# Patient Record
Sex: FEMALE | Race: WHITE | NOT HISPANIC OR LATINO | Employment: PART TIME | URBAN - METROPOLITAN AREA
[De-identification: names, ages, dates, MRNs, and addresses within clinical notes are randomized per-mention and may not be internally consistent; named-entity substitution may affect disease eponyms.]

---

## 2018-07-11 ENCOUNTER — HOSPITAL ENCOUNTER (OUTPATIENT)
Dept: MAMMOGRAPHY | Facility: CLINIC | Age: 63
Discharge: HOME | End: 2018-07-11
Attending: OBSTETRICS & GYNECOLOGY | Admitting: OBSTETRICS & GYNECOLOGY
Payer: COMMERCIAL

## 2018-07-11 DIAGNOSIS — Z12.31 VISIT FOR SCREENING MAMMOGRAM: ICD-10-CM

## 2018-07-11 PROCEDURE — 77063 BREAST TOMOSYNTHESIS BI: CPT

## 2020-11-03 ENCOUNTER — OFFICE VISIT (OUTPATIENT)
Dept: VASCULAR SURGERY | Facility: CLINIC | Age: 65
End: 2020-11-03
Payer: COMMERCIAL

## 2020-11-03 DIAGNOSIS — I83.813 VARICOSE VEINS OF BILATERAL LOWER EXTREMITIES WITH PAIN: Primary | ICD-10-CM

## 2020-11-03 PROCEDURE — 99203 OFFICE O/P NEW LOW 30 MIN: CPT | Performed by: SURGERY

## 2020-11-03 NOTE — LETTER
11/3/2020         RE: Kylie Kilgore  [address] [facility] [address]        Dear Colleague,    Thank you for referring your patient, Kylie Kilgore, to the [facility] [place]. Please see a copy of my visit note below.    VEIN SOLUTIONS CONSULT    Impression:   1.  Right leg varicose veins with pain.    2.  Scattered bilateral lower extremity spider telangiectasias.    Plan:   I reviewed all the above with Kylie.  She is given a prescription for knee-high and thigh-high compression stockings of 20 to 30 mmHg pressure.  She was instructed in their daily use.  I will arrange for a right leg venous competency study through this office and I will meet with her in the day of that exam to discuss her diagnosis and treatment options.    HPI:   Kylie Kilgore is a healthy 65-year-old female who presents at this time for evaluation of some right leg varicosities.  She openly admits that the real reason for her visit is due to a strong familial history of stroke.  She wonders whether there is any relationship between her varicose veins and her risk of stroke.    Her past medical history is relatively unremarkable.  She has no prior history of venous intervention.      CURRENT MEDICATIONS  No current outpatient medications on file prior to visit.  No current facility-administered medications on file prior to visit.         PAST MEDICAL HISTORY  Past Medical History:   Diagnosis Date     Disorder of bone and cartilage, unspecified 9/23/2004    moderate osteopenia     Other abnormal glucose          PAST SURGICAL HISTORY:  Past Surgical History:   Procedure Laterality Date     C LIGATE FALLOPIAN TUBE,POSTPARTUM  1993    Tubal Ligation     C MAMMOGRAM, SCREENING  6/2000    yearly watching one area, fibrocystic chnages     COLPOSCOPY,BX CERVIX/ENDOCERV CURR  1979     HCL PAP SMEAR  7/2000    Dr Rendon       ALLERGIES     Allergies   Allergen Reactions     Erythromycin        FAMILY HISTORY  Family History    Problem Relation Age of Onset     Diabetes Mother         type 2     Hypertension Mother      Osteoporosis Mother      Cerebrovascular Disease Mother         age 82     Hypertension Father      Diabetes Sister         type 2       SOCIAL HISTORY  Social History     Tobacco Use     Smoking status: Never Smoker     Smokeless tobacco: Never Used   Substance Use Topics     Alcohol use: Yes     Drug use: No       ROS:   Review of Systems   All other systems reviewed and are negative.        EXAM:  There were no vitals taken for this visit.  Physical Exam  Constitutional:       Appearance: Normal appearance.   HENT:      Head: Normocephalic and atraumatic.   Eyes:      General: No scleral icterus.     Extraocular Movements: Extraocular movements intact.      Pupils: Pupils are equal, round, and reactive to light.   Neck:      Musculoskeletal: Normal range of motion.   Cardiovascular:      Rate and Rhythm: Normal rate and regular rhythm.      Pulses: Normal pulses.      Comments: Mild right thigh varicosities.  No edema.  No lipodermatosclerosis change.  Musculoskeletal: Normal range of motion.         General: No swelling.   Skin:     General: Skin is warm and dry.   Neurological:      General: No focal deficit present.      Mental Status: She is alert and oriented to person, place, and time. Mental status is at baseline.   Psychiatric:         Mood and Affect: Mood normal.         Behavior: Behavior normal.         Thought Content: Thought content normal.       Labs:  LIPID RESULTS:  Lab Results   Component Value Date    CHOL 185 08/19/2004    HDL 99 08/19/2004    LDL 75 08/19/2004    TRIG 53 08/19/2004    CHOLHDLRATIO 1.9 08/19/2004       CBC RESULTS:  Lab Results   Component Value Date    HGB 13.2 08/18/2004       BMP RESULTS:  Lab Results   Component Value Date    GLC 95.8 08/18/2004        A1C RESULTS:  No results found for: A1C      Imaging:  No results found for this or any previous visit (from the past 24  hour(s)).        Total face-to-face time was 20 minutes, greater than 50% spent providing counseling and education.    Chong Mathis MD          Again, thank you for allowing me to participate in the care of your patient.        Sincerely,        Chong Mathis MD

## 2020-11-03 NOTE — PROGRESS NOTES
VEIN SOLUTIONS CONSULT    Impression:   1.  Right leg varicose veins with pain.    2.  Scattered bilateral lower extremity spider telangiectasias.    Plan:   I reviewed all the above with Kylie.  She is given a prescription for knee-high and thigh-high compression stockings of 20 to 30 mmHg pressure.  She was instructed in their daily use.  I will arrange for a right leg venous competency study through this office and I will meet with her in the day of that exam to discuss her diagnosis and treatment options.    HPI:   Kylie Kilgore is a healthy 65-year-old female who presents at this time for evaluation of some right leg varicosities.  She openly admits that the real reason for her visit is due to a strong familial history of stroke.  She wonders whether there is any relationship between her varicose veins and her risk of stroke.    Her past medical history is relatively unremarkable.  She has no prior history of venous intervention.      CURRENT MEDICATIONS  No current outpatient medications on file prior to visit.  No current facility-administered medications on file prior to visit.         PAST MEDICAL HISTORY  Past Medical History:   Diagnosis Date     Disorder of bone and cartilage, unspecified 9/23/2004    moderate osteopenia     Other abnormal glucose          PAST SURGICAL HISTORY:  Past Surgical History:   Procedure Laterality Date     C LIGATE FALLOPIAN TUBE,POSTPARTUM  1993    Tubal Ligation     C MAMMOGRAM, SCREENING  6/2000    yearly watching one area, fibrocystic chnages     COLPOSCOPY,BX CERVIX/ENDOCERV CURR  1979     HCL PAP SMEAR  7/2000    Dr Rendon       ALLERGIES     Allergies   Allergen Reactions     Erythromycin        FAMILY HISTORY  Family History   Problem Relation Age of Onset     Diabetes Mother         type 2     Hypertension Mother      Osteoporosis Mother      Cerebrovascular Disease Mother         age 82     Hypertension Father      Diabetes Sister         type 2       SOCIAL  HISTORY  Social History     Tobacco Use     Smoking status: Never Smoker     Smokeless tobacco: Never Used   Substance Use Topics     Alcohol use: Yes     Drug use: No       ROS:   Review of Systems   All other systems reviewed and are negative.        EXAM:  There were no vitals taken for this visit.  Physical Exam  Constitutional:       Appearance: Normal appearance.   HENT:      Head: Normocephalic and atraumatic.   Eyes:      General: No scleral icterus.     Extraocular Movements: Extraocular movements intact.      Pupils: Pupils are equal, round, and reactive to light.   Neck:      Musculoskeletal: Normal range of motion.   Cardiovascular:      Rate and Rhythm: Normal rate and regular rhythm.      Pulses: Normal pulses.      Comments: Mild right thigh varicosities.  No edema.  No lipodermatosclerosis change.  Musculoskeletal: Normal range of motion.         General: No swelling.   Skin:     General: Skin is warm and dry.   Neurological:      General: No focal deficit present.      Mental Status: She is alert and oriented to person, place, and time. Mental status is at baseline.   Psychiatric:         Mood and Affect: Mood normal.         Behavior: Behavior normal.         Thought Content: Thought content normal.       Labs:  LIPID RESULTS:  Lab Results   Component Value Date    CHOL 185 08/19/2004    HDL 99 08/19/2004    LDL 75 08/19/2004    TRIG 53 08/19/2004    CHOLHDLRATIO 1.9 08/19/2004       CBC RESULTS:  Lab Results   Component Value Date    HGB 13.2 08/18/2004       BMP RESULTS:  Lab Results   Component Value Date    GLC 95.8 08/18/2004        A1C RESULTS:  No results found for: A1C      Imaging:  No results found for this or any previous visit (from the past 24 hour(s)).        Total face-to-face time was 20 minutes, greater than 50% spent providing counseling and education.    Chong Mathis MD

## 2020-12-01 ENCOUNTER — OFFICE VISIT (OUTPATIENT)
Dept: VASCULAR SURGERY | Facility: CLINIC | Age: 65
End: 2020-12-01
Attending: SURGERY
Payer: COMMERCIAL

## 2020-12-01 ENCOUNTER — ANCILLARY PROCEDURE (OUTPATIENT)
Dept: ULTRASOUND IMAGING | Facility: CLINIC | Age: 65
End: 2020-12-01
Attending: SURGERY
Payer: COMMERCIAL

## 2020-12-01 DIAGNOSIS — I83.813 VARICOSE VEINS OF BILATERAL LOWER EXTREMITIES WITH PAIN: ICD-10-CM

## 2020-12-01 DIAGNOSIS — I83.91 ASYMPTOMATIC VARICOSE VEINS OF RIGHT LOWER EXTREMITY: Primary | ICD-10-CM

## 2020-12-01 PROCEDURE — 99213 OFFICE O/P EST LOW 20 MIN: CPT | Performed by: SURGERY

## 2020-12-01 PROCEDURE — 93971 EXTREMITY STUDY: CPT | Mod: RT | Performed by: SURGERY

## 2020-12-01 NOTE — PROGRESS NOTES
Kylie Kilgore is a 65-year-old female previously evaluated by me in this office for right leg varicosities.  Please see my dictation from that 11/3/2020 visit.  She returns today for a right leg venous competency study.  She has been wearing thigh-high compression stockings with some symptomatic relief of her mild right leg discomfort.  There has been no other change in her health history or her physical exam.    Imaging:  Right leg venous ultrasound performed earlier today shows no deep venous pathology.  She has some incompetence of her right greater saphenous vein from the saphenofemoral junction to the distal thigh.  Her right greater saphenous vein below that point is competent.  The incompetent stretch of the right thigh GSV ranges between 5.0 mm at the junction to 3.8 millimeters distally.    ASSESSMENT:  1.  Incompetence of the right thigh greater saphenous vein from the saphenofemoral junction to the distal thigh.  Minimal associated right leg discomfort easily relieved by compression stockings.    RECOMMENDATION:  I had a nice discussion with Kylie reviewing her ultrasound results.  She understands that she has incompetence of a somewhat small right thigh greater saphenous vein which could potentially be ablated.  Her primary reason for presenting to this clinic was her strong concerns about a familial history of stroke.  She now realizes that limited lower extremity venous incompetence has nothing to do with her risks for stroke.  Her right leg venous symptoms are mild and easily controlled with compression stockings.  Presently she is not interested in right leg venous intervention and I am comfortable with that decision.  She will wear her stockings daily.  Venous follow-up can be with me on an as-needed basis.    Total face-to-face time was 15 minutes, greater than 50% spent providing counseling and education.    Jose Mathis MD

## 2020-12-01 NOTE — LETTER
12/1/2020         RE: Kylie Kilgore  [address] [facility] [address]        Dear Colleague,    Thank you for referring your patient, Kylie Kilgore, to the [facility]IN [facility] [place]. Please see a copy of my visit note below.    Kylie Kilgore is a 65-year-old female previously evaluated by me in this office for right leg varicosities.  Please see my dictation from that 11/3/2020 visit.  She returns today for a right leg venous competency study.  She has been wearing thigh-high compression stockings with some symptomatic relief of her mild right leg discomfort.  There has been no other change in her health history or her physical exam.    Imaging:  Right leg venous ultrasound performed earlier today shows no deep venous pathology.  She has some incompetence of her right greater saphenous vein from the saphenofemoral junction to the distal thigh.  Her right greater saphenous vein below that point is competent.  The incompetent stretch of the right thigh GSV ranges between 5.0 mm at the junction to 3.8 millimeters distally.    ASSESSMENT:  1.  Incompetence of the right thigh greater saphenous vein from the saphenofemoral junction to the distal thigh.  Minimal associated right leg discomfort easily relieved by compression stockings.    RECOMMENDATION:  I had a nice discussion with Kylie reviewing her ultrasound results.  She understands that she has incompetence of a somewhat small right thigh greater saphenous vein which could potentially be ablated.  Her primary reason for presenting to this clinic was her strong concerns about a familial history of stroke.  She now realizes that limited lower extremity venous incompetence has nothing to do with her risks for stroke.  Her right leg venous symptoms are mild and easily controlled with compression stockings.  Presently she is not interested in right leg venous intervention and I am comfortable with that decision.  She will wear her stockings daily.   Venous follow-up can be with me on an as-needed basis.    Total face-to-face time was 15 minutes, greater than 50% spent providing counseling and education.    Jose Mathis MD      Again, thank you for allowing me to participate in the care of your patient.        Sincerely,        Chong Mathis MD

## 2021-03-13 ENCOUNTER — HEALTH MAINTENANCE LETTER (OUTPATIENT)
Age: 66
End: 2021-03-13

## 2021-05-26 ENCOUNTER — TRANSFERRED RECORDS (OUTPATIENT)
Dept: HEALTH INFORMATION MANAGEMENT | Facility: CLINIC | Age: 66
End: 2021-05-26

## 2021-06-22 ENCOUNTER — MEDICAL CORRESPONDENCE (OUTPATIENT)
Dept: HEALTH INFORMATION MANAGEMENT | Facility: CLINIC | Age: 66
End: 2021-06-22

## 2021-06-22 ENCOUNTER — TRANSFERRED RECORDS (OUTPATIENT)
Dept: HEALTH INFORMATION MANAGEMENT | Facility: CLINIC | Age: 66
End: 2021-06-22

## 2021-06-22 DIAGNOSIS — R94.31 ELECTROCARDIOGRAM SHOWING T WAVE ABNORMALITIES: Primary | ICD-10-CM

## 2021-07-08 PROBLEM — N81.10 VAGINAL WALL PROLAPSE: Status: ACTIVE | Noted: 2021-07-08

## 2021-07-08 PROBLEM — R94.31 ABNORMAL ELECTROCARDIOGRAM: Status: ACTIVE | Noted: 2021-07-08

## 2021-07-08 PROBLEM — N81.4 UTERINE PROLAPSE: Status: ACTIVE | Noted: 2021-07-08

## 2021-07-12 ENCOUNTER — HOSPITAL ENCOUNTER (OUTPATIENT)
Dept: MAMMOGRAPHY | Facility: CLINIC | Age: 66
End: 2021-07-12
Attending: OBSTETRICS & GYNECOLOGY
Payer: COMMERCIAL

## 2021-07-12 ENCOUNTER — HOSPITAL ENCOUNTER (OUTPATIENT)
Dept: ULTRASOUND IMAGING | Facility: CLINIC | Age: 66
End: 2021-07-12
Attending: OBSTETRICS & GYNECOLOGY
Payer: COMMERCIAL

## 2021-07-12 ENCOUNTER — ANCILLARY PROCEDURE (OUTPATIENT)
Dept: BONE DENSITY | Facility: CLINIC | Age: 66
End: 2021-07-12
Attending: OBSTETRICS & GYNECOLOGY
Payer: COMMERCIAL

## 2021-07-12 DIAGNOSIS — Z13.820 ENCOUNTER FOR SCREENING FOR OSTEOPOROSIS: ICD-10-CM

## 2021-07-12 DIAGNOSIS — N63.11 LUMP IN UPPER OUTER QUADRANT OF RIGHT BREAST: ICD-10-CM

## 2021-07-12 PROCEDURE — 77080 DXA BONE DENSITY AXIAL: CPT | Performed by: INTERNAL MEDICINE

## 2021-07-12 PROCEDURE — 77062 BREAST TOMOSYNTHESIS BI: CPT

## 2021-07-12 PROCEDURE — 76642 ULTRASOUND BREAST LIMITED: CPT | Mod: RT

## 2021-07-19 ENCOUNTER — OFFICE VISIT (OUTPATIENT)
Dept: CARDIOLOGY | Facility: CLINIC | Age: 66
End: 2021-07-19
Payer: COMMERCIAL

## 2021-07-19 VITALS
SYSTOLIC BLOOD PRESSURE: 132 MMHG | DIASTOLIC BLOOD PRESSURE: 82 MMHG | WEIGHT: 132 LBS | OXYGEN SATURATION: 99 % | HEIGHT: 63 IN | BODY MASS INDEX: 23.39 KG/M2 | HEART RATE: 69 BPM

## 2021-07-19 DIAGNOSIS — R94.31 NONSPECIFIC ABNORMAL ELECTROCARDIOGRAM (ECG) (EKG): ICD-10-CM

## 2021-07-19 DIAGNOSIS — Z13.6 SCREENING FOR CARDIOVASCULAR CONDITION: Primary | ICD-10-CM

## 2021-07-19 DIAGNOSIS — I20.89 ATYPICAL ANGINA (H): ICD-10-CM

## 2021-07-19 DIAGNOSIS — Z82.3 FAMILY HISTORY OF CEREBROVASCULAR ACCIDENT (CVA): ICD-10-CM

## 2021-07-19 PROCEDURE — 99203 OFFICE O/P NEW LOW 30 MIN: CPT | Performed by: INTERNAL MEDICINE

## 2021-07-19 PROCEDURE — 93000 ELECTROCARDIOGRAM COMPLETE: CPT | Performed by: INTERNAL MEDICINE

## 2021-07-19 ASSESSMENT — MIFFLIN-ST. JEOR: SCORE: 1102.88

## 2021-07-19 NOTE — LETTER
2021    MD Zuri Whitaker Consults [address]  [facility] [address]    RE: Kylie Kilgore       Dear Colleague,    I had the pleasure of seeing Kylie Kilgore in the [facility].    CARDIOLOGY CLINIC CONSULTATION      REASON FOR CONSULT:   Abnormal ECG    PRIMARY CARE PHYSICIAN:  Cristi Rendon        History of Present Illness   Kylie Kilgore is an extremely pleasant 66 year old female with no personal past medical history, but with a family history of CVA and atrial fibrillation, here to discuss an abnormal ECG that was done by her life insurance company.  She tells me that from a family history standpoint, her mother  of a stroke in her early 80s, 1 brother  from complications related to atrial fibrillation in his early 70s, and another brother had a stroke in his early 70s but survived.  No family history of CAD, stents, bypass surgery, etc.  She smoked briefly in high school, but not since then.  She does drink around 4 or 5 drinks per weekend.    On 2021, she had an ECG done as part of a life insurance evaluation.  The tracing quality is not great, but she was told that she had T wave inversions, and as result her PCP, Dr. Rendon, referred her here for evaluation.  She is very active and walks daily, with essentially no symptoms.  She has no chest pain or shortness of breath with exertion, though at the end of her walks when she leans down to take off her dog's collar, when she stands up she does get dizzy for around 30 seconds.  This does not happen at other times of the day.  Otherwise, no complaints.    I repeated an ECG in the office today, this showed normal sinus rhythm with nonspecific T wave changes and slightly delayed R wave progression.  There is no echo or other ischemic evaluation or system.  She showed me a printout of all of her recent labs, and this showed an LDL of 68 and an HDL of  84.      Assessment & Plan     1. Abnormal ECG, intermittent lightheadedness following exertion  2. Family history of CVA and atrial fibrillation      It was a pleasure to meet with Jaclyn today.  We discussed the meaning of her ECG abnormalities.  Overall, I reassured her that these are likely normal and in the setting of her relatively minimal symptoms, probably do not represent a concerning finding.  That said, with her family history of CVA and some lightheadedness following exertion, I think it is reasonable to check an exercise stress echo to rule out any evidence of inducible ischemia and/or any valvular abnormalities.  If this is reassuring, I do not think that she needs any regular cardiac follow-up.  We also discussed her cholesterol test, and I congratulated her on her great results.  However, I did caution her that I am a bit concerned that her HDL of 84 may be a signal of excessive alcohol intake, and did caution her about watching her drinking.  Otherwise, I think that she is doing great and encouraged her to continue doing what she is doing.      -Exercise stress echo  -No indication for statin at this time  -Continue with excellent diet and exercise regimen      Follow-up: No routine cardiac follow-up needed at this time unless MARK is significantly abnormal.  However, we are always happy to see Jaclyn back at anytime in the future if any questions or concerns arise.        William Romano MD  Interventional Cardiology  July 19, 2021        Medications   No current outpatient medications on file.     No current facility-administered medications for this visit.     Allergies   Allergies   Allergen Reactions     Erythromycin          Physical Exam       BP: 132/82 Pulse: 69     SpO2: 99 %      Vital Signs with Ranges  Pulse:  [69] 69  BP: (132)/(82) 132/82  SpO2:  [99 %] 99 %  132 lbs 0 oz    Constitutional: Well-appearing, no acute distress  Respiratory: Normal respiratory effort, CTAB  Cardiovascular:  RRR, no m/r/g.  JVP < 7 cm H2O.  There is no LE edema.  Normal carotid upstrokes, no carotid bruits.                      Thank you for allowing me to participate in the care of your patient.      Sincerely,     William Romano MD     [facility]  cc:   No referring provider defined for this encounter.

## 2021-07-19 NOTE — PROGRESS NOTES
CARDIOLOGY CLINIC CONSULTATION      REASON FOR CONSULT:   Abnormal ECG    PRIMARY CARE PHYSICIAN:  Cristi Rendon        History of Present Illness   Kylie Kilgore is an extremely pleasant 66 year old female with no personal past medical history, but with a family history of CVA and atrial fibrillation, here to discuss an abnormal ECG that was done by her life insurance company.  She tells me that from a family history standpoint, her mother  of a stroke in her early 80s, 1 brother  from complications related to atrial fibrillation in his early 70s, and another brother had a stroke in his early 70s but survived.  No family history of CAD, stents, bypass surgery, etc.  She smoked briefly in high school, but not since then.  She does drink around 4 or 5 drinks per weekend.    On 2021, she had an ECG done as part of a life insurance evaluation.  The tracing quality is not great, but she was told that she had T wave inversions, and as result her PCP, Dr. Rendon, referred her here for evaluation.  She is very active and walks daily, with essentially no symptoms.  She has no chest pain or shortness of breath with exertion, though at the end of her walks when she leans down to take off her dog's collar, when she stands up she does get dizzy for around 30 seconds.  This does not happen at other times of the day.  Otherwise, no complaints.    I repeated an ECG in the office today, this showed normal sinus rhythm with nonspecific T wave changes and slightly delayed R wave progression.  There is no echo or other ischemic evaluation or system.  She showed me a printout of all of her recent labs, and this showed an LDL of 68 and an HDL of 84.      Assessment & Plan     1. Abnormal ECG, intermittent lightheadedness following exertion  2. Family history of CVA and atrial fibrillation      It was a pleasure to meet with Jaclyn today.  We discussed the meaning of her ECG abnormalities.  Overall, I reassured her that these  are likely normal and in the setting of her relatively minimal symptoms, probably do not represent a concerning finding.  That said, with her family history of CVA and some lightheadedness following exertion, I think it is reasonable to check an exercise stress echo to rule out any evidence of inducible ischemia and/or any valvular abnormalities.  If this is reassuring, I do not think that she needs any regular cardiac follow-up.  We also discussed her cholesterol test, and I congratulated her on her great results.  However, I did caution her that I am a bit concerned that her HDL of 84 may be a signal of excessive alcohol intake, and did caution her about watching her drinking.  Otherwise, I think that she is doing great and encouraged her to continue doing what she is doing.      -Exercise stress echo  -No indication for statin at this time  -Continue with excellent diet and exercise regimen      Follow-up: No routine cardiac follow-up needed at this time unless MARK is significantly abnormal.  However, we are always happy to see Jaclyn back at anytime in the future if any questions or concerns arise.        William Romano MD  Interventional Cardiology  July 19, 2021        Medications   No current outpatient medications on file.     No current facility-administered medications for this visit.     Allergies   Allergies   Allergen Reactions     Erythromycin          Physical Exam       BP: 132/82 Pulse: 69     SpO2: 99 %      Vital Signs with Ranges  Pulse:  [69] 69  BP: (132)/(82) 132/82  SpO2:  [99 %] 99 %  132 lbs 0 oz    Constitutional: Well-appearing, no acute distress  Respiratory: Normal respiratory effort, CTAB  Cardiovascular: RRR, no m/r/g.  JVP < 7 cm H2O.  There is no LE edema.  Normal carotid upstrokes, no carotid bruits.

## 2021-08-03 ENCOUNTER — MYC MEDICAL ADVICE (OUTPATIENT)
Dept: CARDIOLOGY | Facility: CLINIC | Age: 66
End: 2021-08-03

## 2021-08-03 ENCOUNTER — HOSPITAL ENCOUNTER (OUTPATIENT)
Dept: CARDIOLOGY | Facility: CLINIC | Age: 66
Discharge: HOME | End: 2021-08-03
Attending: INTERNAL MEDICINE | Admitting: INTERNAL MEDICINE
Payer: COMMERCIAL

## 2021-08-03 DIAGNOSIS — I20.89 ATYPICAL ANGINA (H): ICD-10-CM

## 2021-08-03 PROCEDURE — 93016 CV STRESS TEST SUPVJ ONLY: CPT | Performed by: INTERNAL MEDICINE

## 2021-08-03 PROCEDURE — 93017 CV STRESS TEST TRACING ONLY: CPT

## 2021-08-03 PROCEDURE — 93321 DOPPLER ECHO F-UP/LMTD STD: CPT | Mod: 26 | Performed by: INTERNAL MEDICINE

## 2021-08-03 PROCEDURE — 93325 DOPPLER ECHO COLOR FLOW MAPG: CPT | Mod: 26 | Performed by: INTERNAL MEDICINE

## 2021-08-03 PROCEDURE — 93018 CV STRESS TEST I&R ONLY: CPT | Performed by: INTERNAL MEDICINE

## 2021-08-03 PROCEDURE — 93350 STRESS TTE ONLY: CPT | Mod: 26 | Performed by: INTERNAL MEDICINE

## 2021-08-03 PROCEDURE — 93350 STRESS TTE ONLY: CPT | Mod: TC

## 2021-08-03 NOTE — TELEPHONE ENCOUNTER
William Romano MD Haley, Leandra K RN  Caller: Unspecified (Today,  4:09 PM)  Phone Number: [phone]   I'm OK doing something to help her with the life insurance, but I need to get some guidance from the life insurance company on what they are looking for.  I can describe her test results and that she has relatively low risk of significant CAD, but I have a feeling that won't mean much to the insurance company.     Thanks,   Ramón

## 2021-08-10 NOTE — TELEPHONE ENCOUNTER
William Romano MD Haley, Leandra K, RN  Caller: Unspecified (1 week ago,  4:09 PM)  Phone Number: [phone]   Noah Hennessy     Sorrachel for the delay.  Here's what I would put:       Ms. Kilgore was seen in clinic due to an abnormal screening ECG as part of a life insurance exam.  I repeated an ECG in the office, and this showed mild T-wave abnormalities and poor R-wave progression in the precordial leads.  Both of these are non-specific findings which do not necessarily indicate underlying coronary artery disease or other cardiac disease generally.  We then had her perform an exercise stress echocardiogram.  She did very well with this, achieving target heart rate, demonstrating above-average exercise capacity, and had no significant inducible ECG or echocardiographic abnormalities consistent with ischemia.  She also had no significant valve abnormalities.  Taken together, these results overall indicate a low risk of obstructive coronary artery disease.     Thanks

## 2021-10-23 ENCOUNTER — HEALTH MAINTENANCE LETTER (OUTPATIENT)
Age: 66
End: 2021-10-23

## 2022-04-09 ENCOUNTER — HEALTH MAINTENANCE LETTER (OUTPATIENT)
Age: 67
End: 2022-04-09

## 2022-07-30 ENCOUNTER — HEALTH MAINTENANCE LETTER (OUTPATIENT)
Age: 67
End: 2022-07-30

## 2022-10-09 ENCOUNTER — HEALTH MAINTENANCE LETTER (OUTPATIENT)
Age: 67
End: 2022-10-09

## 2023-07-10 ENCOUNTER — APPOINTMENT (OUTPATIENT)
Dept: URBAN - METROPOLITAN AREA CLINIC 259 | Age: 68
Setting detail: DERMATOLOGY
End: 2023-07-13

## 2023-07-10 PROBLEM — C44.319 BASAL CELL CARCINOMA OF SKIN OF OTHER PARTS OF FACE: Status: ACTIVE | Noted: 2023-07-10

## 2023-07-10 PROCEDURE — 14040 TIS TRNFR F/C/C/M/N/A/G/H/F: CPT

## 2023-07-10 PROCEDURE — OTHER MOHS SURGERY: OTHER

## 2023-07-10 PROCEDURE — OTHER MIPS QUALITY: OTHER

## 2023-07-10 PROCEDURE — 17311 MOHS 1 STAGE H/N/HF/G: CPT

## 2023-07-10 NOTE — PROCEDURE: MOHS SURGERY
Body Location Override (Optional - Billing Will Still Be Based On Selected Body Map Location If Applicable): Left Medial Eyebrow

## 2023-07-10 NOTE — PROCEDURE: MIPS QUALITY
Detail Level: Detailed
Quality 431: Preventive Care And Screening: Unhealthy Alcohol Use - Screening: Patient not identified as an unhealthy alcohol user when screened for unhealthy alcohol use using a systematic screening method
Quality 110: Preventive Care And Screening: Influenza Immunization: Influenza Immunization Administered during Influenza season
Quality 226: Preventive Care And Screening: Tobacco Use: Screening And Cessation Intervention: Patient screened for tobacco use and is an ex/non-smoker
Quality 111:Pneumonia Vaccination Status For Older Adults: Patient received any pneumococcal conjugate or polysaccharide vaccine on or after their 60th birthday and before the end of the measurement period
Quality 130: Documentation Of Current Medications In The Medical Record: Current Medications Documented
Quality 143: Oncology: Medical And Radiation- Pain Intensity Quantified: Pain severity quantified, no pain present

## 2023-07-10 NOTE — PROCEDURE: MOHS SURGERY
Suturegard Intro: Intraoperative tissue expansion was performed, utilizing the SUTUREGARD device, in order to reduce wound tension. 0 = understands/communicates without difficulty

## 2023-07-18 ENCOUNTER — APPOINTMENT (OUTPATIENT)
Dept: URBAN - METROPOLITAN AREA CLINIC 253 | Age: 68
Setting detail: DERMATOLOGY
End: 2023-07-18

## 2023-07-18 DIAGNOSIS — Z48.02 ENCOUNTER FOR REMOVAL OF SUTURES: ICD-10-CM

## 2023-07-18 PROCEDURE — OTHER PHOTO-DOCUMENTATION: OTHER

## 2023-07-18 PROCEDURE — OTHER SUTURE REMOVAL (GLOBAL PERIOD): OTHER

## 2023-07-18 ASSESSMENT — LOCATION DETAILED DESCRIPTION DERM: LOCATION DETAILED: LEFT MEDIAL EYEBROW

## 2023-07-18 ASSESSMENT — LOCATION SIMPLE DESCRIPTION DERM: LOCATION SIMPLE: LEFT EYEBROW

## 2023-07-18 ASSESSMENT — LOCATION ZONE DERM: LOCATION ZONE: FACE

## 2023-07-18 NOTE — PROCEDURE: SUTURE REMOVAL (GLOBAL PERIOD)
Detail Level: Detailed
Add 69180 Cpt? (Important Note: In 2017 The Use Of 77804 Is Being Tracked By Cms To Determine Future Global Period Reimbursement For Global Periods): no

## 2023-08-07 ENCOUNTER — LAB REQUISITION (OUTPATIENT)
Dept: LAB | Facility: CLINIC | Age: 68
End: 2023-08-07
Payer: COMMERCIAL

## 2023-08-07 DIAGNOSIS — Z13.1 ENCOUNTER FOR SCREENING FOR DIABETES MELLITUS: ICD-10-CM

## 2023-08-07 DIAGNOSIS — Z13.29 ENCOUNTER FOR SCREENING FOR OTHER SUSPECTED ENDOCRINE DISORDER: ICD-10-CM

## 2023-08-07 DIAGNOSIS — Z13.9 ENCOUNTER FOR SCREENING, UNSPECIFIED: ICD-10-CM

## 2023-08-07 DIAGNOSIS — Z13.220 ENCOUNTER FOR SCREENING FOR LIPOID DISORDERS: ICD-10-CM

## 2023-08-07 LAB
ERYTHROCYTE [DISTWIDTH] IN BLOOD BY AUTOMATED COUNT: 14.3 % (ref 10–15)
HCT VFR BLD AUTO: 40.5 % (ref 35–47)
HGB BLD-MCNC: 12.9 G/DL (ref 11.7–15.7)
MCH RBC QN AUTO: 31 PG (ref 26.5–33)
MCHC RBC AUTO-ENTMCNC: 31.9 G/DL (ref 31.5–36.5)
MCV RBC AUTO: 97 FL (ref 78–100)
PLATELET # BLD AUTO: 252 10E3/UL (ref 150–450)
RBC # BLD AUTO: 4.16 10E6/UL (ref 3.8–5.2)
WBC # BLD AUTO: 5.4 10E3/UL (ref 4–11)

## 2023-08-07 PROCEDURE — 80053 COMPREHEN METABOLIC PANEL: CPT | Mod: ORL | Performed by: OBSTETRICS & GYNECOLOGY

## 2023-08-07 PROCEDURE — 80061 LIPID PANEL: CPT | Mod: ORL | Performed by: OBSTETRICS & GYNECOLOGY

## 2023-08-07 PROCEDURE — 83036 HEMOGLOBIN GLYCOSYLATED A1C: CPT | Mod: ORL | Performed by: OBSTETRICS & GYNECOLOGY

## 2023-08-07 PROCEDURE — 84443 ASSAY THYROID STIM HORMONE: CPT | Mod: ORL | Performed by: OBSTETRICS & GYNECOLOGY

## 2023-08-07 PROCEDURE — 85027 COMPLETE CBC AUTOMATED: CPT | Mod: ORL | Performed by: OBSTETRICS & GYNECOLOGY

## 2023-08-08 LAB
ALBUMIN SERPL BCG-MCNC: 4.7 G/DL (ref 3.5–5.2)
ALP SERPL-CCNC: 76 U/L (ref 35–104)
ALT SERPL W P-5'-P-CCNC: 24 U/L (ref 0–50)
ANION GAP SERPL CALCULATED.3IONS-SCNC: 11 MMOL/L (ref 7–15)
AST SERPL W P-5'-P-CCNC: 35 U/L (ref 0–45)
BILIRUB SERPL-MCNC: 0.8 MG/DL
BUN SERPL-MCNC: 10 MG/DL (ref 8–23)
CALCIUM SERPL-MCNC: 9.2 MG/DL (ref 8.8–10.2)
CHLORIDE SERPL-SCNC: 98 MMOL/L (ref 98–107)
CHOLEST SERPL-MCNC: 200 MG/DL
CREAT SERPL-MCNC: 0.58 MG/DL (ref 0.51–0.95)
DEPRECATED HCO3 PLAS-SCNC: 26 MMOL/L (ref 22–29)
GFR SERPL CREATININE-BSD FRML MDRD: >90 ML/MIN/1.73M2
GLUCOSE SERPL-MCNC: 80 MG/DL (ref 70–99)
HBA1C MFR BLD: 5.8 %
HDLC SERPL-MCNC: 109 MG/DL
LDLC SERPL CALC-MCNC: 81 MG/DL
NONHDLC SERPL-MCNC: 91 MG/DL
POTASSIUM SERPL-SCNC: 4.5 MMOL/L (ref 3.4–5.3)
PROT SERPL-MCNC: 7.1 G/DL (ref 6.4–8.3)
SODIUM SERPL-SCNC: 135 MMOL/L (ref 136–145)
TRIGL SERPL-MCNC: 50 MG/DL
TSH SERPL DL<=0.005 MIU/L-ACNC: 1.3 UIU/ML (ref 0.3–4.2)

## 2023-08-11 DIAGNOSIS — R06.83 SNORING: Primary | ICD-10-CM

## 2023-08-19 ENCOUNTER — HEALTH MAINTENANCE LETTER (OUTPATIENT)
Age: 68
End: 2023-08-19

## 2023-10-11 ENCOUNTER — ANCILLARY PROCEDURE (OUTPATIENT)
Dept: BONE DENSITY | Facility: CLINIC | Age: 68
End: 2023-10-11
Attending: OBSTETRICS & GYNECOLOGY
Payer: COMMERCIAL

## 2023-10-11 DIAGNOSIS — Z78.0 ASYMPTOMATIC MENOPAUSAL STATE: ICD-10-CM

## 2023-10-11 PROCEDURE — 77080 DXA BONE DENSITY AXIAL: CPT | Performed by: INTERNAL MEDICINE

## 2024-01-16 ASSESSMENT — SLEEP AND FATIGUE QUESTIONNAIRES
HOW LIKELY ARE YOU TO NOD OFF OR FALL ASLEEP IN A CAR, WHILE STOPPED FOR A FEW MINUTES IN TRAFFIC: WOULD NEVER DOZE
HOW LIKELY ARE YOU TO NOD OFF OR FALL ASLEEP WHILE SITTING AND READING: MODERATE CHANCE OF DOZING
HOW LIKELY ARE YOU TO NOD OFF OR FALL ASLEEP WHILE SITTING INACTIVE IN A PUBLIC PLACE: SLIGHT CHANCE OF DOZING
HOW LIKELY ARE YOU TO NOD OFF OR FALL ASLEEP WHEN YOU ARE A PASSENGER IN A CAR FOR AN HOUR WITHOUT A BREAK: SLIGHT CHANCE OF DOZING
HOW LIKELY ARE YOU TO NOD OFF OR FALL ASLEEP WHILE SITTING QUIETLY AFTER LUNCH WITHOUT ALCOHOL: SLIGHT CHANCE OF DOZING
HOW LIKELY ARE YOU TO NOD OFF OR FALL ASLEEP WHILE LYING DOWN TO REST IN THE AFTERNOON WHEN CIRCUMSTANCES PERMIT: SLIGHT CHANCE OF DOZING
HOW LIKELY ARE YOU TO NOD OFF OR FALL ASLEEP WHILE SITTING AND TALKING TO SOMEONE: WOULD NEVER DOZE
HOW LIKELY ARE YOU TO NOD OFF OR FALL ASLEEP WHILE WATCHING TV: MODERATE CHANCE OF DOZING

## 2024-01-17 ENCOUNTER — OFFICE VISIT (OUTPATIENT)
Dept: SLEEP MEDICINE | Facility: CLINIC | Age: 69
End: 2024-01-17
Attending: OBSTETRICS & GYNECOLOGY
Payer: COMMERCIAL

## 2024-01-17 VITALS
OXYGEN SATURATION: 97 % | DIASTOLIC BLOOD PRESSURE: 79 MMHG | HEART RATE: 74 BPM | BODY MASS INDEX: 25.51 KG/M2 | WEIGHT: 144 LBS | SYSTOLIC BLOOD PRESSURE: 134 MMHG

## 2024-01-17 DIAGNOSIS — R06.83 SNORING: ICD-10-CM

## 2024-01-17 DIAGNOSIS — R06.81 WITNESSED APNEIC SPELLS: Primary | ICD-10-CM

## 2024-01-17 PROCEDURE — 99204 OFFICE O/P NEW MOD 45 MIN: CPT | Performed by: PHYSICIAN ASSISTANT

## 2024-01-17 NOTE — PATIENT INSTRUCTIONS
"        MY TREATMENT INFORMATION FOR SLEEP APNEA-  Kylie Kilgore    Frequently asked questions:  1. What is Obstructive Sleep Apnea (GHANSHYAM)? GHANSHYAM is the most common type of sleep apnea. Apnea means, \"without breath.\"  Apnea is most often caused by narrowing or collapse of the upper airway as muscles relax during sleep.   Almost everyone has occasional apneas. Most people with sleep apnea have had brief interruptions at night frequently for many years.  The severity of sleep apnea is related to how frequent and severe the events are.   2. What are the consequences of GHANSHYAM? Symptoms include: feeling sleepy during the day, snoring loudly, gasping or stopping of breathing, trouble sleeping, and occasionally morning headaches or heartburn at night.  Sleepiness can be serious and even increase the risk of falling asleep while driving. Other health consequences may include development of high blood pressure and other cardiovascular disease in persons who are susceptible. Untreated GHANSHYAM  can contribute to heart disease, stroke and diabetes.   3. What are the treatment options? In most situations, sleep apnea is a lifelong disease that must be managed with daily therapy. Medications are not effective for sleep apnea and surgery is generally not considered until other therapies have been tried. Your treatment is your choice . Continuous Positive Airway (CPAP) works right away and is the therapy that is effective in nearly everyone. An oral device to hold your jaw forward is usually the next most reliable option. Other options include postioning devices (to keep you off your back), weight loss, and surgery including a tongue pacing device. There is more detail about some of these options below.  4. Are my sleep studies covered by insurance? Although we will request verification of coverage, we advise you also check in advance of the study to ensure there is coverage.    Important tips for those choosing CPAP and similar " devices  For new devices, sign up for device LESLI to monitor your device for your followup visits  We encourage you to utilize the [employer] lesli or website ([employer] web (resmed.com) ) to monitor your therapy progress and share the data with your healthcare team when you discuss your sleep apnea.                                                    Know your equipment:  CPAP is continuous positive airway pressure that prevents obstructive sleep apnea by keeping the throat from collapsing while you are sleeping. In most cases, the device is  smart  and can slowly self-adjusts if your throat collapses and keeps a record every day of how well you are treated-this information is available to you and your care team.  BPAP is bilevel positive airway pressure that keeps your throat open and also assists each breath with a pressure boost to maintain adequate breathing.  Special kinds of BPAP are used in patients who have inadequate breathing from lung or heart disease. In most cases, the device is  smart  and can slowly self-adjusts to assist breathing. Like CPAP, the device keeps a record of how well you are treated.  Your mask is your connection to the device. You get to choose what feels most comfortable and the staff will help to make sure if fits. Here: are some examples of the different masks that are available: Magnetic mask aids may assist with use but there are safety issues that should be addressed when considering with magnets* ( see end of discussion).       Key points to remember on your journey with sleep apnea:  Sleep study.  PAP devices often need to be adjusted during a sleep study to show that they are effective and adjusted right.  Good tips to remember: Try wearing just the mask during a quiet time during the day so your body adapts to wearing it. A humidifier is recommended for comfort in most cases to prevent drying of your nose and throat. Allergy medication from your provider may help you if you are  having nasal congestion.  Getting settled-in. It takes more than one night for most of us to get used to wearing a mask. Try wearing just the mask during a quiet time during the day so your body adapts to wearing it. A humidifier is recommended for comfort in most cases. Our team will work with you carefully on the first day and will be in contact within 4 days and again at 2 and 4 weeks for advice and remote device adjustments. Your therapy is evaluated by the device each day.   Use it every night. The more you are able to sleep naturally for 7-8 hours, the more likely you will have good sleep and to prevent health risks or symptoms from sleep apnea. Even if you use it 4 hours it helps. Occasionally all of us are unable to use a medical therapy, in sleep apnea, it is not dangerous to miss one night.   Communicate. Call our skilled team on the number provided on the first day if your visit for problems that make it difficult to wear the device. Over 2 out of 3 patients can learn to wear the device long-term with help from our team. Remember to call our team or your sleep providers if you are unable to wear the device as we may have other solutions for those who cannot adapt to mask CPAP therapy. It is recommended that you sleep your sleep provider within the first 3 months and yearly after that if you are not having problems.   Use it for your health. We encourage use of CPAP masks during daytime quiet periods to allow your face and brain to adapt to the sensation of CPAP so that it will be a more natural sensation to awaken to at night or during naps. This can be very useful during the first few weeks or months of adapting to CPAP though it does not help medically to wear CPAP during wakefulness and  should not be used as a strategy just to meet guidelines.  Take care of your equipment. Make sure you clean your mask and tubing using directions every day and that your filter and mask are replaced as recommended or  if they are not working.     *Masks with magnets:  Updated Contraindications  Masks with magnetic components are contraindicated for use by patients where they, or anyone in close physical contact while using the mask, have the following:   Active medical implants that interact with magnets (i.e., pacemakers, implantable cardioverter defibrillators (ICD), neurostimulators, cerebrospinal fluid (CSF) shunts, insulin/infusion pumps)   Metallic implants/objects containing ferromagnetic material (i.e., aneurysm clips/flow disruption devices, embolic coils, stents, valves, electrodes, implants to restore hearing or balance with implanted magnets, ocular implants, metallic splinters in the eye)  Updated Warning  Keep the mask magnets at a safe distance of at least 6 inches (150 mm) away from implants or medical devices that may be adversely affected by magnetic interference. This warning applies to you or anyone in close physical contact with your mask. The magnets are in the frame and lower headgear clips, with a magnetic field strength of up to 400mT. When worn, they connect to secure the mask but may inadvertently detach while asleep.  Implants/medical devices, including those listed within contraindications, may be adversely affected if they change function under external magnetic fields or contain ferromagnetic materials that attract/repel to magnetic fields (some metallic implants, e.g., contact lenses with metal, dental implants, metallic cranial plates, screws, emanuel hole covers, and bone substitute devices). Consult your physician and  of your implant / other medical device for information on the potential adverse effects of magnetic fields.    BESIDES CPAP, WHAT OTHER THERAPIES ARE THERE?    Positioning Device  Positioning devices are generally used when sleep apnea is mild and only occurs on your back.This example shows a pillow that straps around the waist. It may be appropriate for those whose sleep  study shows milder sleep apnea that occurs primarily when lying flat on one's back. Preliminary studies have shown benefit but effectiveness at home may need to be verified by a home sleep test. These devices are generally not covered by medical insurance.  Examples of devices that maintain sleeping on the back to prevent snoring and mild sleep apnea.    Belt type body positioner  http://[employer]/    Electronic reminder  http://nightshifttherapy.com/            Oral Appliance  What is oral appliance therapy?  An oral appliance device fits on your teeth at night like a retainer used after having braces. The device is made by a specialized dentist and requires several visits over 1-2 months before a manufactured device is made to fit your teeth and is adjusted to prevent your sleep apnea. Once an oral device is working properly, snoring should be improved. A home sleep test may be recommended at that time if to determine whether the sleep apnea is adequately treated.       Some things to remember:  -Oral devices are often, but not always, covered by your medical insurance. Be sure to check with your insurance provider.   -If you are referred for oral therapy, you will be given a list of specialized dentists to consider or you may choose to visit the Web site of the American Academy of Dental Sleep Medicine  -Oral devices are less likely to work if you have severe sleep apnea or are extremely overweight.     More detailed information  An oral appliance is a small acrylic device that fits over the upper and lower teeth  (similar to a retainer or a mouth guard). This device slightly moves jaw forward, which moves the base of the tongue forward, opens the airway, improves breathing for effective treat snoring and obstructive sleep apnea in perhaps 7 out of 10 people .  The best working devices are custom-made by a dental device  after a mold is made of the teeth 1, 2, 3.  When is an oral appliance  indicated?  Oral appliance therapy is recommended as a first-line treatment for patients with primary snoring, mild sleep apnea, and for patients with moderate sleep apnea who prefer appliance therapy to use of CPAP4, 5. Severity of sleep apnea is determined by sleep testing and is based on the number of respiratory events per hour of sleep.   How successful is oral appliance therapy?  The success rate of oral appliance therapy in patients with mild sleep apnea is 75-80% while in patients with moderate sleep apnea it is 50-70%. The chance of success in patients with severe sleep apnea is 40-50%. The research also shows that oral appliances have a beneficial effect on the cardiovascular health of GHANSHYAM patients at the same magnitude as CPAP therapy7.  Oral appliances should be a second-line treatment in cases of severe sleep apnea, but if not completely successful then a combination therapy utilizing CPAP plus oral appliance therapy may be effective. Oral appliances tend to be effective in a broad range of patients although studies show that the patients who have the highest success are females, younger patients, those with milder disease, and less severe obesity. 3, 6.   Finding a dentist that practices dental sleep medicine  Specific training is available through the American Academy of Dental Sleep Medicine for dentists interested in working in the field of sleep. To find a dentist who is educated in the field of sleep and the use of oral appliances, near you, visit the Web site of the American Academy of Dental Sleep Medicine.    References  1. Umair et al. Objectively measured vs self-reported compliance during oral appliance therapy for sleep-disordered breathing. Chest 2013; 144(5): 6147-9014.  2. Desiree, et al. Objective measurement of compliance during oral appliance therapy for sleep-disordered breathing. Thorax 2013; 68(1): 91-96.  3. Latonya et al. Mandibular advancement devices in 620 men and  women with GHANSHYAM and snoring: tolerability and predictors of treatment success. Chest 2004; 125: 4651-2936.  4. Ekaterina et al. Oral appliances for snoring and GHANSHYAM: a review. Sleep 2006; 29: 244-262.  5. Sanford et al. Oral appliance treatment for GHANSHYAM: an update. J Clin Sleep Med 2014; 10(2): 215-227.  6. Michael et al. Predictors of OSAH treatment outcome. J Dent Res 2007; 86: 2754-4031.      Weight Loss:    Weight loss is a long-term strategy that may improve sleep apnea in some patients.    Weight management is a personal decision and the decision should be based on your interest and the potential benefits.  If you are interested in exploring weight loss strategies, the following discussion covers the impact on weight loss on sleep apnea and the approaches that may be successful.    Being overweight does not necessarily mean you will have health consequences.  Those who have BMI over 35 or over 27 with existing medical conditions carries greater risk.   Weight loss decreases severity of sleep apnea in most people with obesity. For those with mild obesity who have developed snoring with weight gain, even 15-30 pound weight loss can improve and occasionally eliminate sleep apnea.  Structured and life-long dietary and health habits are necessary to lose weight and keep healthier weight levels.     Though there may be significant health benefits from weight loss, long-term weight loss is very difficult to achieve- studies show success with dietary management in less than 10% of people. In addition, substantial weight loss may require years of dietary control and may be difficult if patients have severe obesity. In these cases, surgical management may be considered.  Finally, older individuals who have tolerated obesity without health complications may be less likely to benefit from weight loss strategies.      [unfilled]    Surgery:    Surgery for obstructive sleep apnea is considered generally only when other  therapies fail to work. Surgery may be discussed with you if you are having a difficult time tolerating CPAP and or when there is an abnormal structure that requires surgical correction.  Nose and throat surgeries often enlarge the airway to prevent collapse.  Most of these surgeries create pain for 1-2 weeks and up to half of the most common surgeries are not effective throughout life.  You should carefully discuss the benefits and drawbacks to surgery with your sleep provider and surgeon to determine if it is the best solution for you.   More information  Surgery for GHANSHYAM is directed at areas that are responsible for narrowing or complete obstruction of the airway during sleep.  There are a wide range of procedures available to enlarge and/or stabilize the airway to prevent blockage of breathing in the three major areas where it can occur: the palate, tongue, and nasal regions.  Successful surgical treatment depends on the accurate identification of the factors responsible for obstructive sleep apnea in each person.  A personalized approach is required because there is no single treatment that works well for everyone.  Because of anatomic variation, consultation with an examination by a sleep surgeon is a critical first step in determining what surgical options are best for each patient.  In some cases, examination during sedation may be recommended in order to guide the selection of procedures.  Patients will be counseled about risks and benefits as well as the typical recovery course after surgery. Surgery is typically not a cure for a person s GHANSHYAM.  However, surgery will often significantly improve one s GHANSHYAM severity (termed  success rate ).  Even in the absence of a cure, surgery will decrease the cardiovascular risk associated with OSA7; improve overall quality of life8 (sleepiness, functionality, sleep quality, etc).      Palate Procedures:  Patients with GHANSHYAM often have narrowing of their airway in the region  of their tonsils and uvula.  The goals of palate procedures are to widen the airway in this region as well as to help the tissues resist collapse.  Modern palate procedure techniques focus on tissue conservation and soft tissue rearrangement, rather than tissue removal.  Often the uvula is preserved in this procedure. Residual sleep apnea is common in patient after pharyngoplasty with an average reduction in sleep apnea events of 33%2.      Tongue Procedures:  ExamWhile patients are awake, the muscles that surround the throat are active and keep this region open for breathing. These muscles relax during sleep, allowing the tongue and other structures to collapse and block breathing.  There are several different tongue procedures available.  Selection of a tongue base procedure depends on characteristics seen on physical exam.  Generally, procedures are aimed at removing bulky tissues in this area or preventing the back of the tongue from falling back during sleep.  Success rates for tongue surgery range from 50-62%3.    Hypoglossal Nerve Stimulation:  Hypoglossal nerve stimulation has recently received approval from the [place] Food and Drug Administration for the treatment of obstructive sleep apnea.  This is based on research showing that the system was safe and effective in treating sleep apnea6.  Results showed that the median AHI score decreased 68%, from 29.3 to 9.0. This therapy uses an implant system that senses breathing patterns and delivers mild stimulation to airway muscles, which keeps the airway open during sleep.  The system consists of three fully implanted components: a small generator (similar in size to a pacemaker), a breathing sensor, and a stimulation lead.  Using a small handheld remote, a patient turns the therapy on before bed and off upon awakening.    Candidates for this device must be greater than 18 years of age, have moderate to severe GHANSHYAM (AHI between 15-65), BMI less than 35,  have tried CPAP/oral appliance for at least 8 weeks without success, and have appropriate upper airway anatomy (determined by a sleep endoscopy performed by Dr. Germain Brian).    Hypoglossal Nerve Stimulation Pathway:    The sleep surgeon s office will work with the patient through the insurance prior-authorization process (including communications and appeals).    Nasal Procedures:  Nasal obstruction can interfere with nasal breathing during the day and night.  Studies have shown that relief of nasal obstruction can improve the ability of some patients to tolerate positive airway pressure therapy for obstructive sleep apnea1.  Treatment options include medications such as nasal saline, topical corticosteroid and antihistamine sprays, and oral medications such as antihistamines or decongestants. Non-surgical treatments can include external nasal dilators for selected patients. If these are not successful by themselves, surgery can improve the nasal airway either alone or in combination with these other options.      Combination Procedures:  Combination of surgical procedures and other treatments may be recommended, particularly if patients have more than one area of narrowing or persistent positional disease.  The success rate of combination surgery ranges from 66-80%2,3.    References  Paulina RODRIGUEZ. The Role of the Nose in Snoring and Obstructive Sleep Apnoea: An Update.  Eur Arch Otorhinolaryngol. 2011; 268: 1365-73.   Ambika SM; Shu JA; Rajesh JR; Pallanch JF; Yael MB; Mike SG; Alicia CORTEZ. Surgical modifications of the upper airway for obstructive sleep apnea in adults: a systematic review and meta-analysis. SLEEP 2010;33(10):0581-1523. Kathie AMEZCUA. Hypopharyngeal surgery in obstructive sleep apnea: an evidence-based medicine review.  Arch Otolaryngol Head Neck Surg. 2006 Feb;132(2):206-13.  Wu YH1, Coy Y, Waqas MERY. The efficacy of anatomically based multilevel surgery for obstructive sleep apnea.  Otolaryngol Head Neck Surg. 2003 Oct;129(4):327-35.  Kathie AMEZCUA, Goldberg A. Hypopharyngeal Surgery in Obstructive Sleep Apnea: An Evidence-Based Medicine Review. Arch Otolaryngol Head Neck Surg. 2006 Feb;132(2):206-13.  Tasha WRIGHT et al. Upper-Airway Stimulation for Obstructive Sleep Apnea.  N Engl J Med. 2014 Jan 9;370(2):139-49.  Maribel Y et al. Increased Incidence of Cardiovascular Disease in Middle-aged Men with Obstructive Sleep Apnea. Am J Respir Crit Care Med; 2002 166: 159-165  Lehman EM et al. Studying Life Effects and Effectiveness of Palatopharyngoplasty (SLEEP) study: Subjective Outcomes of Isolated Uvulopalatopharyngoplasty. Otolaryngol Head Neck Surg. 2011; 144: 623-631.        WHAT IF I ONLY HAVE SNORING?    Mandibular advancement devices, lateral sleep positioning, long-term weight loss and treatment of nasal allergies have been shown to improve snoring.  Exercising tongue muscles with a game (https://[employer]yan/lesli/soundly-reduce-snoring/ub2898319212) or stimulating the tongue during the day with a device (https://doi.org/10.3390/fdc58597650) have improved snoring in some individuals.    Remember to Drive Safe... Drive Alive     Sleep health profoundly affects your health, mood, and your safety.  Thirty three percent of the population (one in three of us) is not getting enough sleep and many have a sleep disorder. Not getting enough sleep or having an untreated / undertreated sleep condition may make us sleepy without even knowing it. In fact, our driving could be dramatically impaired due to our sleep health. As your provider, here are some things I would like you to know about driving:     Here are some warning signs for impairment and dangerous drowsy driving:              -Having been awake more than 16 hours               -Looking tired               -Eyelid drooping              -Head nodding (it could be too late at this point)              -Driving for more than 30 minutes     Some  things you could do to make the driving safer if you are experiencing some drowsiness:              -Stop driving and rest              -Call for transportation              -Make sure your sleep disorder is adequately treated     Some things that have been shown NOT to work when experiencing drowsiness while driving:              -Turning on the radio              -Opening windows              -Eating any  distracting  /  entertaining  foods (e.g., sunflower seeds, candy, or any other)              -Talking on the phone      Your decision may not only impact your life, but also the life of others. Please, remember to drive safe for yourself and all of us.           [facility]  Clinic Office Hours  Monday-- Friday  8:00 am -- 4:30 pm [facility]  Clinic Numbers- [phone]  Sleep Lab Address   [address] [address]  Sleep Lab Phone: [phone]    Your sleep study has been scheduled for: 6/3/2024           Sleep Lab Telephone: [phone]      Thank you for choosing [facility] at [facility].  Please take your time to read through this information.  This information has been designed to help you understand what it takes to begin your journey to a better night's rest. The contents of this packet include:  Study Preparation Instructions  An outline of your Sleep Study's Procedures.  Directions to the [facility] Location.    Our technical staff will accommodate you as much as they are able. However, if you have special needs during the night in which you require a Personal Care Attendant, please make arrangements to bring that person along with you and notify us ahead of time.      SLEEP MEDICINE CLINIC     Upon arrival please park in the East Parking Ramp and park on the main level. Take elevator or stairs to Level B of ramp.  Enter back door and press silver button on the wall to your right (doorbell) this will alert staff you have arrived as building is locked  after 5:30.  Please bring your insurance card to the study with you  You will receive a confirmation call 48 hours prior to your study time. If you do not receive a confirmation call, please call [facility] at [phone].  if you need to cancel or reschedule for any reason, please contact us 48 hours before your scheduled appointment at [phone]  Make arrangements to follow-up with your specialist to review your sleep study.  We hope to make your night with us as comfortable and as pleasant as possible. If you have any questions after reading through this packet, please feel free to call us.        TO PREPARE FOR YOUR SLEEP STUDY, PLEASE FOLLOW THE FOLLOWING GUIDELINES:    Please have the next day available for continued testing if  necessary.   Please bathe and wash your hair the day of the tests. Hair should be clean, dry, and free from excessive oils, gels or sprays.  Upon arrival, men should be clean shaven (no stubble). If you have a mustache, goatee, or beard, it is not necessary to shave it off.  Bring comfortable 2-piece sleepwear.  If you prefer, you can bring your own pillow, blanket, orthopedic device, etc.  Arise at your regular time on the morning of your study and DO NOT NAP during the day.  Avoid stimulants (coffee, tea, chocolate, soda pop, energy drinks, any other caffeine drinks) after noon on the day of the study.  Eat a normal evening meal before you arrive for your study.   9.  If you prefer a specific snack before bedtime, please bring it along. We have a refrigerator and a microwave available.  10. Continue to take all prescribed medications unless otherwise ordered by your provider.  11. Bring medications for a 24-hour period, as well as any medications prescribed specifically for your sleep study.  12. If you are incontinent please bring your Adult pads, depends or diapers.  13. Feel free to bring your own toiletries (Our lab will have them if you forget).  14. Please DO NOT  wear gel nail polish or gel covering, it is ideal not to have any polish on at all, this can cause discrepancies to the equipment that is used and needed to perform your sleep study.      1. Arrival      [facility] at [facility] at 8:00 P.M. to complete any necessary paperwork.  2. Interview      After settling into your room, your technologist will explain what will happen during your study. Please feel free to ask any questions about your study.   3. Patient Set-up  The entire process is non-invasive and painless. We will be measuring your head and placing electrodes on your head and face. This will allow us to monitor your sleep. In addition, we will place monitors and sensors on your body that will monitor your breathing, limb movements, snoring and heart rate. Your set-up will take about 45 minutes to 1 hour to complete.  Lights Out  When it is time for bed, your technologist will help you get comfortable. Comfort is essential for a good night's sleep. If you are uncomfortable in any way, please tell your technologist. Before turning out the lights, your technologist will check your equipment. These checks will help you to have the best study possible. After the lights are turned off, you are free to sleep in whatever position is most comfortable to you. Please note: at some point during the night, you will be asked to sleep on your back. If you are unable to sleep on your back, please let your technologist know. Also, even though you will be connected to wires, you will be able to use the restroom when needed.  **Remember: this is a medical test so phones and television will be turned off for this test.  5. Lights On  The technologist will allow you to sleep in until 6:00 -- 7:00 A.M. If you have a specific wake-up time requirement, please notify the technologist so that he or she may accommodate your needs. All the water-soluble conduction gel, electrodes and monitoring devices are easily removed.  Upon completion, you will want to shower before you start your day. We do supply soap, shampoo and towels, though, if you prefer to bring your own supplies, please feel free to do so. A light breakfast of juice, fresh fruit, cereal, toast or English muffin is available for you in our [facility] nutrition area. Please be aware that your technologist will not be able to disclose any information regarding the findings of your study. This information will come from your provider at your follow-up appt.  Please schedule follow up appointment two weeks after your sleep study, if a follow up has not already been scheduled for your results.  Scheduling number is [phone]      CANCELLATION / RESCHEDULING    Our care team at [facility], strives to provide exceptional - high quality care to all of our customers.     We have set aside our technical experts time and a room especially for you to perform your sleep study.     We appreciate your partnership in helping us best meet the need for our services for as many people in need as we possibly can. This is limited to the number of sleep study rooms and skilled care providers we have available.     Working together to serve your comprehensive sleep health needs is very important to us. However, we do understand that changing an appointment is sometimes necessary.    If you find that you are unable to make your scheduled appointment, please contact us directly at least 24-hours prior to your scheduled appointment at [phone]. If you are greeted by our answering system, please spell your name, give your date of birth and leave a detailed message.    If you cancel with less than a 24-hour notice or do not show up for your sleep study without prior notification, we will send a letter to your primary care provider and our sleep  will contact you personally to assist in rescheduling your study.            Thank you for choosing [facility] for your sleep health needs.  Please note that [facility] are not responsible for any items left during your stay.

## 2024-01-17 NOTE — PROGRESS NOTES
Outpatient Sleep Medicine Consultation:    Name: Kylie Kilgore MRN# [id number]   Age: 69 year old YOB: 1955     Date of Consultation: January 17, 2024  Consultation is requested by: Malgorzata Mckenna MD  [address] [address] Malgorzata Mckenna  Primary care provider: Cristi Rendon       Reason for Sleep Consult:     Kylie Kilgore is sent by Malgorzata Mckenna for a sleep consultation regarding Snoring.    Patient s Reason for visit  Kylie Kilgore main reason for visit: My children and an anesthesiologist told me i stop breathing  Patient states problem(s) started: Years ago  Kylie Kilgore's goals for this visit: Address the not breathing         Assessment and Plan:     1. Snoring  2. Witnessed apneic spells    Patient is being evaluated for Obstructive Sleep Apnea (GHANSHYAM).  Patient's risk factors for GHANSHYAM include: snoring, witnessed apneas, gasp arousals, age >50. We discussed pathophysiology of GHANSHYAM and consequences of untreated moderate to severe sleep apnea such as a higher risk of hypertension, cardiovascular disease, cardiac arrhythmias, and stroke. Patient is interested in treatment and willing to undergo overnight sleep testing. Discussed testing with overnight attended polysomnography versus home sleep apnea testing and order placed today for diagnostic PSG.  Briefly discussed potential treatment modalities (CPAP, oral appliance, Inspire due to patient inquiry) in the event sleep apnea is identified on testing and additional information given in patient instructions.  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   has CPAP at home so she is very aware of what this entails, though not particularly excited about the aspect of the mask this is what she would like to pursue assuming her study is positive.  We agreed that I will send her results via [employer] after I  receive them and place empiric orders for CPAP assuming testing is positive.  Will then plan to see her back about 2 months after starting on the CPAP for CPAP follow-up visit.  - Comprehensive Sleep Study; Future         History of Present Illness:     Kylie Kilgore is a 69-year-old female who presents to clinic today with concern of obstructive sleep apnea given snoring, witnessed apneic events by family and anesthesiologist with past colonoscopy, gasp/snort arousals.  Patient reports family history of stroke in mother and brother, would like to treat any underlying sleep apnea if present to help decrease her cardiovascular risk.    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  Wakes up due to Snorting self awake;Use the bathroom  She has trouble falling back asleep 4 times a week.   It usually takes 2hrs or can t to get back to sleep  Patient is usually up at 5am if working. 7 if not  Uses alarm: Yes    Weekends/Non-work Days/All Other Days:  Usually gets into bed at 9-10pm   Takes patient about 10mn to fall asleep  Patient is usually up at 7 to 8 am  Uses alarm: No    Sleep Need  Patient estimates she gets 6-7 hrs sleep on average   Patient thinks she needs about 8hrs sleep    Kylie Kilgore prefers to sleep in this position(s): Side     Naps  Patient takes a purposeful nap 0 times a week   She dozes off unintentionally 0 days per week  Patient has had a driving accident or near-miss due to sleepiness/drowsiness: No  She had a total [place] Sleepiness Scale of 8 (01/16/24 1224), with scores of 10 or higher indicating abnormal levels of sleepiness.    SLEEP DISRUPTIONS:    Breathing/Snoring  Patient snores:Yes  Other people complain about her snoring: Yes  Patient has been told  "she stops breathing in her sleep:Yes  She has issues with the following: Morning headaches;Morning mouth dryness    Movement:  Patient gets pain, discomfort, with an urge to move:  Yes -denies classic RLS symptoms but states \"I get cramping in my leg once in a while it's just painful like lack of water\"  It happens when she is resting:  Yes  It happens more at night:  Yes  It improves with movement: Yes  Patient has been told she kicks her legs at night:  No     Behaviors in Sleep:  Kylie Kilgore has experienced the following behaviors while sleeping: Sleep-talking  Denies sleepwalking, sleep eating, bruxism, dream enactment, recurring nightmares, night terrors, sleep paralysis, hypnagogic/hypnopompic hallucinations, cataplexy      CAFFEINE AND OTHER SUBSTANCES:    Patient consumes caffeinated beverages per day:  0  List of any prescribed or over the counter stimulants that patient takes:  None  List of any prescribed or over the counter sleep medication patient takes:  None  Patient drinks alcohol to help them sleep: No  Patient drinks alcohol near bedtime: Yes with dinner    Family History:  Patient has a family member been diagnosed with a sleep disorder: Yes -suspected in brother but never tested    SCALES:    EPWORTH SLEEPINESS SCALE         1/16/2024    12:24 PM    [place] Sleepiness Scale ( DAVID Conner  [id number]<br>ESS - [place]/English - Final version - 21 Nov 07 - [facility] [place].)   Sitting and reading Moderate chance of dozing   Watching TV Moderate chance of dozing   Sitting, inactive in a public place (e.g. a theatre or a meeting) Slight chance of dozing   As a passenger in a car for an hour without a break Slight chance of dozing   Lying down to rest in the afternoon when circumstances permit Slight chance of dozing   Sitting and talking to someone Would never doze   Sitting quietly after a lunch without alcohol Slight chance of dozing   In a car, while stopped for a few minutes in traffic Would " never doze   Forsyth Score (MC) 8   Forsyth Score (Sleep) 8         INSOMNIA SEVERITY INDEX (CHELLY)          1/16/2024    12:05 PM   Insomnia Severity Index (CHELLY)   Difficulty falling asleep 1   Difficulty staying asleep 2   Problems waking up too early 2   How SATISFIED/DISSATISFIED are you with your CURRENT sleep pattern? 3   How NOTICEABLE to others do you think your sleep problem is in terms of impairing the quality of your life? 3   How WORRIED/DISTRESSED are you about your current sleep problem? 3   To what extent do you consider your sleep problem to INTERFERE with your daily functioning (e.g. daytime fatigue, mood, ability to function at work/daily chores, concentration, memory, mood, etc.) CURRENTLY? 2   CHELLY Total Score 16       Guidelines for Scoring/Interpretation:  Total score categories:  0-7 = No clinically significant insomnia   8-14 = Subthreshold insomnia   15-21 = Clinical insomnia (moderate severity)  22-28 = Clinical insomnia (severe)  Used via courtesy of www.[place].comth.va.gov with permission from Hector Saha PhD., [facility]      Allergies:    Allergies   Allergen Reactions    Erythromycin        Medications:    No current outpatient medications on file.       Problem List:  Patient Active Problem List    Diagnosis Date Noted    Abnormal electrocardiogram-Inverted TWaves.  07/08/2021     Priority: Medium    Uterine prolapse 07/08/2021     Priority: Medium    Vaginal wall prolapse 07/08/2021     Priority: Medium    Abnormal glucose      Priority: Medium     Problem list name updated by automated process. Provider to review      Disorder of bone and cartilage 09/23/2004     Priority: Medium     Problem list name updated by automated process. Provider to review      Other abnormal Papanicolaou smear of cervix and cervical HPV(795.09) 01/01/1989     Priority: Medium     (Problem list name updated by automated process. Provider to review and confirm.)          Past Medical/Surgical  History:  Past Medical History:   Diagnosis Date    Disorder of bone and cartilage, unspecified 9/23/2004    moderate osteopenia    Other abnormal glucose      Past Surgical History:   Procedure Laterality Date    C MAMMOGRAM, SCREENING  6/2000    yearly watching one area, fibrocystic chnages    COLPOSCOPY,BX CERVIX/ENDOCERV CURR  1979    HCL PAP SMEAR  7/2000    Dr Justice ALCANTAR LIGATE FALLOPIAN TUBE,POSTPARTUM  1993    Tubal Ligation       Social History:  Social History     Socioeconomic History    Marital status:      Spouse name: Not on file    Number of children: 3    Years of education: Not on file    Highest education level: Not on file   Occupational History    Not on file   Tobacco Use    Smoking status: Never    Smokeless tobacco: Never   Substance and Sexual Activity    Alcohol use: Yes    Drug use: No    Sexual activity: Yes     Birth control/protection: Surgical     Comment: tubal   Other Topics Concern     Service No    Blood Transfusions No    Caffeine Concern Yes     Comment: 2 cups coffee    Occupational Exposure Yes    Hobby Hazards No    Sleep Concern No    Stress Concern No    Weight Concern No    Special Diet No    Back Care No    Exercise Yes     Comment: walker    Bike Helmet Not Asked    Seat Belt Yes    Self-Exams Not Asked    Parent/sibling w/ CABG, MI or angioplasty before 65F 55M? Not Asked   Social History Narrative    Not on file     Social Determinants of Health     Financial Resource Strain: Not on file   Food Insecurity: Not on file   Transportation Needs: Not on file   Physical Activity: Not on file   Stress: Not on file   Social Connections: Not on file   Interpersonal Safety: Not on file   Housing Stability: Not on file       Family History:  Family History   Problem Relation Age of Onset    Diabetes Mother         type 2    Hypertension Mother     Osteoporosis Mother     Cerebrovascular Disease Mother         age 82    Hypertension Father     Diabetes Sister        "  type 2       Review of Systems:  In the last TWO WEEKS have you experienced any of the following symptoms?  Fevers: No  Night Sweats: Yes  Weight Gain: No  Pain at Night: No  Double Vision: No  Changes in Vision: No  Difficulty Breathing through Nose: No  Sore Throat in Morning: No  Dry Mouth in the Morning: Yes  Shortness of Breath Lying Flat: Yes  Shortness of Breath With Activity: No  Awakening with Shortness of Breath: Yes  Increased Cough: No  Heart Racing at Night: No  Swelling in Feet or Legs: No  Diarrhea at Night: No  Heartburn at Night: No  Urinating More than Once at Night: No  Losing Control of Urine at Night: No  Joint Pains at Night: Yes  Headaches in Morning: Yes  Weakness in Arms or Legs: No  Depressed Mood: No  Anxiety: No     Physical Examination:  Vitals: /79   Pulse 74   Wt 65.3 kg (144 lb)   SpO2 97%   BMI 25.51 kg/m    BMI= Body mass index is 25.51 kg/m .  General appearance: Awake, alert, cooperative. Well groomed. Sitting comfortably in chair. In no apparent distress.  HEENT: Head: Normocephalic, atraumatic. Eyes: PERRL. Conjunctiva clear. Sclera normal. Nose:External appearance normal.   Neck: Neck Cir (cm): 36 cm (1/17/2024  2:27 PM)  Skin:  No rashes or significant lesions.   Neurologic: Alert, oriented x3. No focal neurological deficit. Gait normal.   Psychiatric: Mood euthymic. Affect congruent with full range and intensity.         Data: All pertinent previous laboratory data reviewed     Recent Labs   Lab Test 08/07/23  1624   *   POTASSIUM 4.5   CHLORIDE 98   CO2 26   ANIONGAP 11   GLC 80   BUN 10.0   CR 0.58   JIM 9.2       Recent Labs   Lab Test 08/07/23  1624   WBC 5.4   RBC 4.16   HGB 12.9   HCT 40.5   MCV 97   MCH 31.0   MCHC 31.9   RDW 14.3          Recent Labs   Lab Test 08/07/23  1624   PROTTOTAL 7.1   ALBUMIN 4.7   BILITOTAL 0.8   ALKPHOS 76   AST 35   ALT 24       TSH (uIU/mL)   Date Value   08/07/2023 1.30       No results found for: \"UAMP\", " "\"UBARB\", \"BENZODIAZEUR\", \"UCANN\", \"UCOC\", \"OPIT\", \"UPCP\"    No results found for: \"IRONSAT\", \"DC96423\", \"TONI\"    No results found for: \"PH\", \"PHARTERIAL\", \"PO2\", \"TF2XCNEBVES\", \"SAT\", \"PCO2\", \"HCO3\", \"BASEEXCESS\", \"ELLI\", \"BEB\"    @LABRCNTIPR(phv:4,pco2v:4,po2v:4,hco3v:4,ann marie:4,o2per:4)@    Echocardiology: No results found for this or any previous visit (from the past 4320 hour(s)).    Chest x-ray: No results found for this or any previous visit from the past 365 days.      Chest CT: No results found for this or any previous visit from the past 365 days.      PFT: Most Recent [facility] Pulmonary Function Testing    No results found for: \"20001\"  No results found for: \"20002\"  No results found for: \"20003\"  No results found for: \"20015\"  No results found for: \"20016\"  No results found for: \"20027\"  No results found for: \"20028\"  No results found for: \"20029\"  No results found for: \"20079\"  No results found for: \"20080\"  No results found for: \"20081\"  No results found for: \"20335\"  No results found for: \"20105\"  No results found for: \"20053\"  No results found for: \"20054\"  No results found for: \"20055\"      Kaila Leija PA-C 1/17/2024     [facility]  [address], [place] [address]     [facility]  [address], [place] [address]    Chart documentation was completed, in part, with "[employer] voice-recognition software. Even though reviewed, some grammatical, spelling, and word errors may remain.    46 minutes spent on day of encounter reviewing medical records, history and physical examination, review of previous testing and interpretation, documentation and further activities as noted above  "

## 2024-01-17 NOTE — NURSING NOTE
"Chief Complaint   Patient presents with    Sleep Problem     Snoring, witnessed apneic spells, family history of stroke, awakened by snoring/snort, anesthesiologist noticed she stopped breathing.        Initial /79   Pulse 74   Wt 65.3 kg (144 lb)   SpO2 97%   BMI 25.51 kg/m   Estimated body mass index is 25.51 kg/m  as calculated from the following:    Height as of 7/19/21: 1.6 m (5' 3\").    Weight as of this encounter: 65.3 kg (144 lb).    Medication Reconciliation: complete    Neck circumference: 14 inches / 36 centimeters.    ESS 8    CHELLY 16    Khai Stewart CMA (AAMA)  "

## 2024-06-01 ASSESSMENT — SLEEP AND FATIGUE QUESTIONNAIRES
HOW LIKELY ARE YOU TO NOD OFF OR FALL ASLEEP WHILE SITTING AND TALKING TO SOMEONE: WOULD NEVER DOZE
HOW LIKELY ARE YOU TO NOD OFF OR FALL ASLEEP WHILE SITTING QUIETLY AFTER LUNCH WITHOUT ALCOHOL: WOULD NEVER DOZE
HOW LIKELY ARE YOU TO NOD OFF OR FALL ASLEEP IN A CAR, WHILE STOPPED FOR A FEW MINUTES IN TRAFFIC: WOULD NEVER DOZE
HOW LIKELY ARE YOU TO NOD OFF OR FALL ASLEEP WHILE SITTING AND READING: MODERATE CHANCE OF DOZING
HOW LIKELY ARE YOU TO NOD OFF OR FALL ASLEEP WHILE LYING DOWN TO REST IN THE AFTERNOON WHEN CIRCUMSTANCES PERMIT: SLIGHT CHANCE OF DOZING
HOW LIKELY ARE YOU TO NOD OFF OR FALL ASLEEP WHILE SITTING INACTIVE IN A PUBLIC PLACE: WOULD NEVER DOZE
HOW LIKELY ARE YOU TO NOD OFF OR FALL ASLEEP WHILE WATCHING TV: SLIGHT CHANCE OF DOZING
HOW LIKELY ARE YOU TO NOD OFF OR FALL ASLEEP WHEN YOU ARE A PASSENGER IN A CAR FOR AN HOUR WITHOUT A BREAK: SLIGHT CHANCE OF DOZING

## 2024-06-03 ENCOUNTER — THERAPY VISIT (OUTPATIENT)
Dept: SLEEP MEDICINE | Facility: CLINIC | Age: 69
End: 2024-06-03
Payer: COMMERCIAL

## 2024-06-03 DIAGNOSIS — R06.81 WITNESSED APNEIC SPELLS: ICD-10-CM

## 2024-06-03 DIAGNOSIS — R06.83 SNORING: ICD-10-CM

## 2024-06-03 PROCEDURE — 95810 POLYSOM 6/> YRS 4/> PARAM: CPT | Performed by: INTERNAL MEDICINE

## 2024-06-14 ENCOUNTER — MYC MEDICAL ADVICE (OUTPATIENT)
Dept: SLEEP MEDICINE | Facility: CLINIC | Age: 69
End: 2024-06-14
Payer: COMMERCIAL

## 2024-06-14 DIAGNOSIS — G47.33 OSA (OBSTRUCTIVE SLEEP APNEA): Primary | ICD-10-CM

## 2024-06-14 NOTE — PROCEDURES
"SLEEP STUDY INTERPRETATION  DIAGNOSTIC POLYSOMNOGRAPHY REPORT      Patient: BEATA FRENCH  YOB: 1955  Study Date: 6/3/2024  MRN: [id number]  Referring Provider: Malgorzata Mckenna MD  Ordering Provider: Kaila Leija PA-C    Indications for Polysomnography: The patient is a 69 year old Female who is 5' 3\" and weighs 144.0 lbs. Her BMI is 25.5, Morland sleepiness scale 8 and neck circumference is 36 cm. A diagnostic polysomnogram was performed to evaluate for sleep apnea.    Polysomnogram Data: A full night polysomnogram recorded the standard physiologic parameters including EEG, EOG, EMG, ECG, nasal and oral airflow. Respiratory parameters of chest and abdominal movements were recorded with respiratory inductance plethysmography. Oxygen saturation was recorded by pulse oximetry. Hypopnea scoring rule used: 1B 4%.    Sleep Architecture: Fragmented sleep with reduced sleep efficiency.   The total recording time of the polysomnogram was 518.5 minutes. The total sleep time was 362.5 minutes. Sleep latency was increased at 51.4 minutes without the use of a sleep aid. REM latency was 100.5 minutes. Arousal index was increased at 53.0 arousals per hour. Sleep efficiency was decreased at 69.9%. Wake after sleep onset was 104.5 minutes. The patient spent 15.4% of total sleep time in Stage N1, 63.4% in Stage N2, 8.4% in Stage N3, and 12.7% in REM. Time in REM supine was - minutes.    Respiration: Events ? The polysomnogram revealed a presence of 53 obstructive, 12 central, and 8 mixed apneas resulting in an apnea index of 12.1 events per hour. There were 26 obstructive hypopneas and - central hypopneas resulting in an obstructive hypopnea index of 4.3 and central hypopnea index of - events per hour. The combined apnea/hypopnea index was 16.4 events per hour (central apnea/hypopnea index was 2.0 events per hour). The REM AHI was 23.5 events per hour. The supine AHI was 91.6 events per hour. The RERA " index was 3.3 events per hour.  The RDI was 19.7 events per hour.  Snoring - was reported as mild to moderate.  Respiratory rate and pattern - was notable for normal respiratory rate and pattern.  Sustained Sleep Associated Hypoventilation - Transcutaneous carbon dioxide monitoring was not used; however significant hypoventilation was not suggested by oximetry.  Sleep Associated Hypoxemia - (Greater than 5 minutes O2 sat at or below 88%) was not present. Baseline oxygen saturation was 94.1%. Lowest oxygen saturation was 84.0%. Time spent less than or equal to 88% was 1.2 minutes. Time spent less than or equal to 89% was 1.8 minutes.    Movement Activity: Negative for movement abnormalities.   Periodic Limb Activity - There were - PLMs during the entire study. The PLM index was - movements per hour. The PLM Arousal Index was - per hour.  REM EMG Activity - Excessive transient/sustained muscle activity was not present.  Nocturnal Behavior - Abnormal sleep related behaviors were not noted during/arising out of NREM / REM sleep.   Bruxism - None apparent.    Cardiac Summary: Sinus rhythm with occasional PVCs.   The average pulse rate was 56.8 bpm. The minimum pulse rate was 47.0 bpm while the maximum pulse rate was 79.0 bpm.      Assessment:   Moderate degree of obstructive sleep apnea with associated oxygen desaturations and sleep fragmentation. There was minimal supine sleep during this test and supine REM was not captured. Sleep apnea events were exacerbated during the limited supine sleep. If patient's normal sleep pattern includes substantial supine sleep, there can be underestimation of sleep apnea.     Recommendations:  CPAP therapy is the treatment of choice for moderate obstructive sleep apnea. Treatment can be initiated with auto titrating CPAP therapy with a pressure range of 5 to 15 cm H2O.  Recommend appropriate clinical follow-up in 1-3 months after starting treatment to monitor response, compliance and  CPAP download data.  If CPAP therapy is not tolerated or accepted, patient can consider oral appliance therapy through referral to dental sleep medicine.   If devices are not accepted, patient can consider evaluation of surgical options through referral to specialized ENT surgery.    Suggest optimizing sleep hygiene and avoiding sleep deprivation.  Weight management.      Diagnostic Codes:   Obstructive Sleep Apnea G47.33  Repetitive Intrusions Into Sleep F51.8    6/3/2024 [place] Diagnostic Sleep Study (144.0 lbs) - AHI 16.4, RDI 19.7, Supine AHI 91.6, REM AHI 23.5, Low O2 84.0%, Time Spent ?88% 1.2 minutes / Time Spent ?89% 1.8 minutes.     _____________________________________   Electronically Signed By: Apollo Milan MD 06/14/2024

## 2024-06-23 LAB — SLPCOMP: NORMAL

## 2024-07-10 ENCOUNTER — DOCUMENTATION ONLY (OUTPATIENT)
Dept: SLEEP MEDICINE | Facility: CLINIC | Age: 69
End: 2024-07-10
Payer: COMMERCIAL

## 2024-07-10 DIAGNOSIS — G47.33 OBSTRUCTIVE SLEEP APNEA (ADULT) (PEDIATRIC): Primary | ICD-10-CM

## 2024-07-14 NOTE — PROGRESS NOTES
Patient was offered choice of vendor and chose [facility].  Patient Kylie Kilgore was set up at [place] on July 10, 2024. Patient received a Resmed Airsense 11 Pressures were set at  5-15 cm H2O.   Patient s ramp is 5 cm H2O for Auto and FLEX/EPR is EPR, 2.  Patient received a [employer]ics Mask name: DREAMWEAR  Nasal mask size Standard, heated tubing and heated humidifier.  Patient has the following compliance requirements: using and visit requirements  Patient has a follow up on 8/20/2024 with Dr. Milan.    Amy Holder

## 2024-07-15 ENCOUNTER — DOCUMENTATION ONLY (OUTPATIENT)
Dept: SLEEP MEDICINE | Facility: CLINIC | Age: 69
End: 2024-07-15
Payer: COMMERCIAL

## 2024-07-15 NOTE — PROGRESS NOTES
3 day Sleep therapy management telephone visit    Diagnostic AHI: PS.4         Confirmed with patient at time of call- N/A Patient is still interested in STM service       Message left for patient to return call    Order settings:  CPAP MIN CPAP MAX   5 cm H2O 15 cm H2O       Device settings:  CPAP MIN CPAP MAX EPR RESMED SOFT RESPONSE SETTING   5 cm  H20 15 cm  H20 2 OFF       Compliance 0 %    Assessment: No usage but has account in [employer]     Patient has the following upcoming sleep appts:  Future Sleep Appointments         Provider Department    2024 11:00 AM (Arrive by 10:45 AM) Apollo Milan MD [facility] [place]            Replacement device: No  STM ordered by provider: Yes     Total time spent on accessing and  interpreting remote patient PAP therapy data  10 minutes    Total time spent counseling, coaching  and reviewing PAP therapy data with patient  1 minutes    [id number] no

## 2024-07-25 ENCOUNTER — DOCUMENTATION ONLY (OUTPATIENT)
Dept: SLEEP MEDICINE | Facility: CLINIC | Age: 69
End: 2024-07-25
Payer: COMMERCIAL

## 2024-07-25 NOTE — PROGRESS NOTES
14  DAY STM VISIT    Diagnostic AHI: PS.4         MESSAGE LEFT FOR PT TO RETURN CALL    Assessment: {Objective benchmarks:472052}{Objective benchmarks failin}     Action plan: waiting for patient to return call.         PAP settings:  CPAP MIN CPAP MAX 95TH % PRESSURE EPR RESMED SOFT RESPONSE SETTING   *** cm  H20 *** cm  H20 *** cm  H20  *** OFF     Objective measures: 14 day rolling measures   COMPLIANCE LEAK AHI AVERAGE USE IN MINUTES   *** % *** *** ***   GOAL >70% GOAL < 24 LPM GOAL <5 GOAL >240      Patient has the following upcoming sleep appts:  Future Sleep Appointments         Provider Department    2024 11:00 AM (Arrive by 10:45 AM) Apollo Milan MD [facility] [place]            Total time spent on accessing and interpreting remote patient PAP therapy data  10 minutes    Total time spent counseling, coaching  and reviewing PAP therapy data with patient  *** minute    [id number]  [id number]  no (3 day STM)

## 2024-08-16 ASSESSMENT — SLEEP AND FATIGUE QUESTIONNAIRES
HOW LIKELY ARE YOU TO NOD OFF OR FALL ASLEEP WHILE LYING DOWN TO REST IN THE AFTERNOON WHEN CIRCUMSTANCES PERMIT: SLIGHT CHANCE OF DOZING
HOW LIKELY ARE YOU TO NOD OFF OR FALL ASLEEP WHILE WATCHING TV: SLIGHT CHANCE OF DOZING
HOW LIKELY ARE YOU TO NOD OFF OR FALL ASLEEP WHILE SITTING QUIETLY AFTER LUNCH WITHOUT ALCOHOL: WOULD NEVER DOZE
HOW LIKELY ARE YOU TO NOD OFF OR FALL ASLEEP WHILE SITTING AND TALKING TO SOMEONE: WOULD NEVER DOZE
HOW LIKELY ARE YOU TO NOD OFF OR FALL ASLEEP WHILE SITTING AND READING: SLIGHT CHANCE OF DOZING
HOW LIKELY ARE YOU TO NOD OFF OR FALL ASLEEP WHILE SITTING INACTIVE IN A PUBLIC PLACE: WOULD NEVER DOZE
HOW LIKELY ARE YOU TO NOD OFF OR FALL ASLEEP IN A CAR, WHILE STOPPED FOR A FEW MINUTES IN TRAFFIC: WOULD NEVER DOZE
HOW LIKELY ARE YOU TO NOD OFF OR FALL ASLEEP WHEN YOU ARE A PASSENGER IN A CAR FOR AN HOUR WITHOUT A BREAK: SLIGHT CHANCE OF DOZING

## 2024-08-20 ENCOUNTER — OFFICE VISIT (OUTPATIENT)
Dept: SLEEP MEDICINE | Facility: CLINIC | Age: 69
End: 2024-08-20
Payer: COMMERCIAL

## 2024-08-20 VITALS
WEIGHT: 143 LBS | DIASTOLIC BLOOD PRESSURE: 75 MMHG | BODY MASS INDEX: 25.34 KG/M2 | OXYGEN SATURATION: 97 % | SYSTOLIC BLOOD PRESSURE: 134 MMHG | HEIGHT: 63 IN | HEART RATE: 75 BPM

## 2024-08-20 DIAGNOSIS — G47.33 OSA (OBSTRUCTIVE SLEEP APNEA): Primary | ICD-10-CM

## 2024-08-20 PROCEDURE — 99213 OFFICE O/P EST LOW 20 MIN: CPT | Performed by: INTERNAL MEDICINE

## 2024-08-20 RX ORDER — LACTOBACILLUS RHAMNOSUS GG 10B CELL
1 CAPSULE ORAL 2 TIMES DAILY
COMMUNITY

## 2024-08-20 RX ORDER — CHLORAL HYDRATE 500 MG
2 CAPSULE ORAL DAILY
COMMUNITY

## 2024-08-20 NOTE — NURSING NOTE
"Chief Complaint   Patient presents with    CPAP Follow Up    Study Results       Initial /75   Pulse 75   Ht 1.6 m (5' 3\")   Wt 64.9 kg (143 lb)   SpO2 97%   BMI 25.33 kg/m   Estimated body mass index is 25.33 kg/m  as calculated from the following:    Height as of this encounter: 1.6 m (5' 3\").    Weight as of this encounter: 64.9 kg (143 lb).    Medication Reconciliation: complete  ESS 4  Mikayla Romero MA   "

## 2024-08-20 NOTE — PROGRESS NOTES
Obstructive Sleep Apnea - PAP Follow-Up Visit:    Chief Complaint   Patient presents with    CPAP Follow Up     Kylie Kilgore comes in today for follow-up of their moderate sleep apnea, managed with CPAP.     6/3/2024 [place] Diagnostic Sleep Study (144.0 lbs) - AHI 16.4, RDI 19.7, Supine AHI 91.6, REM AHI 23.5, Low O2 84.0%, Time Spent ?88% 1.2 minutes / Time Spent ?89% 1.8 minutes.     Do you use a CPAP Machine at home: Yes  Overall, on a scale of 0-10 how would you rate your CPAP (0 poor, 10 great): 7  Is your mask comfortable: Yes  If not, why:    Is you mask leaking: No  If yes, where do you feel it:    How many night per week does the mask leak (0-7):    Do you notice snoring with mask on: No  Do you notice gasping arousals with mask on: Yes  Are you having significant oral or nasal dryness: Yes  Is the pressure setting comfortable: Yes  In not, why:    What type of mask do you use: Nasal Pillow  What is your typical bedtime: 9  How long does it take you to go to sleep on PAP therapy: 10mn  What time do you typically get out of bed for the day: 5-6am  How many hours on average per night are you using PAP therapy: 6  How many hours are you sleeping per night: 8  Do you feel well rested in the morning: Yes    ResMed     Auto-PAP 5-15 cmH2O download:  30/30 total days of use. 0 nonuse days. 100% days with >4 hours use.  Average use 6 hours 52 minutes per day. Median Leak 8.8 L/min. 95%ile Leak 33.4 L/min. CPAP 95% pressure 10.5 cm. AHI 1.9    EPWORTH SLEEPINESS SCALE         8/16/2024    10:08 AM    [place] Sleepiness Scale ( DAVID Conner  [id number]<br>ESS - [place]/English - Final version - 21 Nov 07 - [facility] [place].)   Sitting and reading Slight chance of dozing   Watching TV Slight chance of dozing   Sitting, inactive in a public place (e.g. a theatre or a meeting) Would never doze   As a passenger in a car for an hour without a break Slight chance of dozing   Lying down to rest in the afternoon when  "circumstances permit Slight chance of dozing   Sitting and talking to someone Would never doze   Sitting quietly after a lunch without alcohol Would never doze   In a car, while stopped for a few minutes in traffic Would never doze   Penelope Score (MC) 4   Penelope Score (Sleep) 4       INSOMNIA SEVERITY INDEX (CHELLY)          8/16/2024    10:02 AM   Insomnia Severity Index (CHELLY)   Difficulty falling asleep 1   Difficulty staying asleep 2   Problems waking up too early 1   How SATISFIED/DISSATISFIED are you with your CURRENT sleep pattern? 2   How NOTICEABLE to others do you think your sleep problem is in terms of impairing the quality of your life? 3   How WORRIED/DISTRESSED are you about your current sleep problem? 3   To what extent do you consider your sleep problem to INTERFERE with your daily functioning (e.g. daytime fatigue, mood, ability to function at work/daily chores, concentration, memory, mood, etc.) CURRENTLY? 1   CHELLY Total Score 13       Guidelines for Scoring/Interpretation:  Total score categories:  0-7 = No clinically significant insomnia   8-14 = Subthreshold insomnia   15-21 = Clinical insomnia (moderate severity)  22-28 = Clinical insomnia (severe)  Used via courtesy of www.LED Opticsealth.va.gov with permission from Hector Saha PhD., [facility]    /75   Pulse 75   Ht 1.6 m (5' 3\")   Wt 64.9 kg (143 lb)   SpO2 97%   BMI 25.33 kg/m      Impression/Plan:     Moderate obstructive sleep apnea.     Patient is using CPAP regularly and is benefiting form therapy.  She has some difficulty adjusting to the mask and sometimes wakes up to readjust the mask. I reviewed download data and graphs from her device for the last 30 days.  Regular compliance and normal resiudal AHI is demonstrated, confirming adequate treatment of sleep apnea.    Plan:     Continue auto PAP therapy 5-15 cm H2O    Kylie Kilgore will follow up in about 1 year(s).     Electronically signed by Dr. Apollo Milan, Diplomate, " Sleep Medicine, ABPN       CC:  Cristi Rendon,

## 2024-08-20 NOTE — PATIENT INSTRUCTIONS
Your There is no height or weight on file to calculate BMI.  Weight management is a personal decision.  If you are interested in exploring weight loss strategies, the following discussion covers the approaches that may be successful. Body mass index (BMI) is one way to tell whether you are at a healthy weight, overweight, or obese. It measures your weight in relation to your height.  A BMI of 18.5 to 24.9 is in the healthy range. A person with a BMI of 25 to 29.9 is considered overweight, and someone with a BMI of 30 or greater is considered obese. More than two-thirds of American adults are considered overweight or obese.  Being overweight or obese increases the risk for further weight gain. Excess weight may lead to heart disease and diabetes.  Creating and following plans for healthy eating and physical activity may help you improve your health.  Weight control is part of healthy lifestyle and includes exercise, emotional health, and healthy eating habits. Careful eating habits lifelong are the mainstay of weight control. Though there are significant health benefits from weight loss, long-term weight loss with diet alone may be very difficult to achieve- studies show long-term success with dietary management in less than 10% of people. Attaining a healthy weight may be especially difficult to achieve in those with severe obesity. In some cases, medications, devices and surgical management might be considered.  What can you do?  If you are overweight or obese and are interested in methods for weight loss, you should discuss this with your provider.   Consider reducing daily calorie intake by 500 calories.   Keep a food journal.   Avoiding skipping meals, consider cutting portions instead.    Diet combined with exercise helps maintain muscle while optimizing fat loss. Strength training is particularly important for building and maintaining muscle mass. Exercise helps reduce stress, increase energy, and improves  fitness. Increasing exercise without diet control, however, may not burn enough calories to loose weight.     Start walking three days a week 10-20 minutes at a time  Work towards walking thirty minutes five days a week   Eventually, increase the speed of your walking for 1-2 minutes at time    In addition, we recommend that you review healthy lifestyles and methods for weight loss available through the National Institutes of Health patient information sites:  http://win.niddk.nih.gov/publications/index.htm    And look into health and wellness programs that may be available through your health insurance provider, employer, local community center, or tristian club.

## 2024-10-12 ENCOUNTER — HEALTH MAINTENANCE LETTER (OUTPATIENT)
Age: 69
End: 2024-10-12

## 2024-12-21 ENCOUNTER — HEALTH MAINTENANCE LETTER (OUTPATIENT)
Age: 69
End: 2024-12-21

## 2025-01-29 ENCOUNTER — LAB (OUTPATIENT)
Dept: LAB | Facility: CLINIC | Age: 70
End: 2025-01-29
Attending: INTERNAL MEDICINE
Payer: COMMERCIAL

## 2025-01-29 ENCOUNTER — OFFICE VISIT (OUTPATIENT)
Dept: CARDIOLOGY | Facility: CLINIC | Age: 70
End: 2025-01-29
Payer: COMMERCIAL

## 2025-01-29 VITALS
WEIGHT: 145.1 LBS | SYSTOLIC BLOOD PRESSURE: 142 MMHG | OXYGEN SATURATION: 98 % | DIASTOLIC BLOOD PRESSURE: 94 MMHG | BODY MASS INDEX: 26.7 KG/M2 | HEIGHT: 62 IN | HEART RATE: 76 BPM

## 2025-01-29 DIAGNOSIS — G47.33 OSA (OBSTRUCTIVE SLEEP APNEA): ICD-10-CM

## 2025-01-29 DIAGNOSIS — I48.0 PAF (PAROXYSMAL ATRIAL FIBRILLATION) (H): Primary | ICD-10-CM

## 2025-01-29 DIAGNOSIS — R94.31 ABNORMAL ELECTROCARDIOGRAM: ICD-10-CM

## 2025-01-29 DIAGNOSIS — I48.0 PAF (PAROXYSMAL ATRIAL FIBRILLATION) (H): ICD-10-CM

## 2025-01-29 LAB
ALT SERPL W P-5'-P-CCNC: 33 U/L (ref 0–50)
ANION GAP SERPL CALCULATED.3IONS-SCNC: 13 MMOL/L (ref 7–15)
BASOPHILS # BLD AUTO: 0 10E3/UL (ref 0–0.2)
BASOPHILS NFR BLD AUTO: 1 %
BUN SERPL-MCNC: 12.1 MG/DL (ref 8–23)
CALCIUM SERPL-MCNC: 9.9 MG/DL (ref 8.8–10.4)
CHLORIDE SERPL-SCNC: 99 MMOL/L (ref 98–107)
CHOLEST SERPL-MCNC: 234 MG/DL
CREAT SERPL-MCNC: 0.65 MG/DL (ref 0.51–0.95)
EGFRCR SERPLBLD CKD-EPI 2021: >90 ML/MIN/1.73M2
EOSINOPHIL # BLD AUTO: 0 10E3/UL (ref 0–0.7)
EOSINOPHIL NFR BLD AUTO: 1 %
ERYTHROCYTE [DISTWIDTH] IN BLOOD BY AUTOMATED COUNT: 13.6 % (ref 10–15)
FASTING STATUS PATIENT QL REPORTED: YES
GLUCOSE SERPL-MCNC: 97 MG/DL (ref 70–99)
HCO3 SERPL-SCNC: 26 MMOL/L (ref 22–29)
HCT VFR BLD AUTO: 40.1 % (ref 35–47)
HDLC SERPL-MCNC: 120 MG/DL
HGB BLD-MCNC: 13.2 G/DL (ref 11.7–15.7)
IMM GRANULOCYTES # BLD: 0 10E3/UL
IMM GRANULOCYTES NFR BLD: 0 %
LDLC SERPL CALC-MCNC: 100 MG/DL
LYMPHOCYTES # BLD AUTO: 2.2 10E3/UL (ref 0.8–5.3)
LYMPHOCYTES NFR BLD AUTO: 49 %
MCH RBC QN AUTO: 30.9 PG (ref 26.5–33)
MCHC RBC AUTO-ENTMCNC: 32.9 G/DL (ref 31.5–36.5)
MCV RBC AUTO: 94 FL (ref 78–100)
MONOCYTES # BLD AUTO: 0.4 10E3/UL (ref 0–1.3)
MONOCYTES NFR BLD AUTO: 10 %
NEUTROPHILS # BLD AUTO: 1.8 10E3/UL (ref 1.6–8.3)
NEUTROPHILS NFR BLD AUTO: 40 %
NONHDLC SERPL-MCNC: 114 MG/DL
NRBC # BLD AUTO: 0 10E3/UL
NRBC BLD AUTO-RTO: 0 /100
PLATELET # BLD AUTO: 227 10E3/UL (ref 150–450)
POTASSIUM SERPL-SCNC: 3.9 MMOL/L (ref 3.4–5.3)
RBC # BLD AUTO: 4.27 10E6/UL (ref 3.8–5.2)
SODIUM SERPL-SCNC: 138 MMOL/L (ref 135–145)
TRIGL SERPL-MCNC: 70 MG/DL
TSH SERPL DL<=0.005 MIU/L-ACNC: 1.68 UIU/ML (ref 0.3–4.2)
WBC # BLD AUTO: 4.4 10E3/UL (ref 4–11)

## 2025-01-29 RX ORDER — CARVEDILOL 6.25 MG/1
6.25 TABLET ORAL 2 TIMES DAILY WITH MEALS
Qty: 180 TABLET | Refills: 3 | Status: SHIPPED | OUTPATIENT
Start: 2025-01-29

## 2025-01-29 RX ORDER — SPIRONOLACTONE 25 MG/1
25 TABLET ORAL DAILY
Qty: 90 TABLET | Refills: 3 | Status: SHIPPED | OUTPATIENT
Start: 2025-01-29

## 2025-01-29 NOTE — PROGRESS NOTES
Cardiology Consultation      Kylie Kilgore MRN# [id number]   YOB: 1955 Age: 70 year old   Date of Visit 01/29/2025     Reason for consult: Paroxysmal atrial fibrillation           Assessment and Plan:     Paroxysmal atrial fibrillation in the setting of hypertension, salt loading and alcohol on vacation  Patient will continue to track her atrial fibrillation burden on her watch.  She will wear it at night as well.  Will start carvedilol and spironolactone.  Check labs today including basic metabolic panel and TSH.  Stress echocardiogram to rule out ischemia as patient did have some chest discomfort with her rapid atrial fibrillation.  Follow-up in 1 month.  May need anticoagulation if recurrence.      Hypertension, salt sensitive  Spironolactone and carvedilol for now.  Titrate as needed.      Coronary risk stratification  CT coronary calcification to see if statin indicated    I will review test results with patient in the interim.  Will have her follow-up with DARLENE in 1 month for further titration of antihypertensives and discussion of test results.      45 minutes spent today in review of past medical record, discussion with patient and postvisit charting    This note was transcribed using electronic voice recognition software, typographical errors may be present.    The longitudinal plan of care for the diagnosis(es)/condition(s) as documented were addressed during this visit. Due to the added complexity in care, I will continue to support Jaclyn in the subsequent management and with ongoing continuity of care.                  Chief Complaint:   No chief complaint on file.           History of Present Illness:   This patient is a very pleasant 70 year old female with family history of atrial fibrillation who comes in with hypertension after going to [place] and eating a lot of salty foods and having an episode of atrial fibrillation caught on her Apple watch that woke her up at 2 in the morning.  She  "does have GHANSHYAM and was compliant on her CPAP.    Heart rate was 122 bpm and it lasted for about 6 hours.  I reviewed the tracing from her phone and it does seem consistent with atrial fibrillation.  She had been drinking significant amount of alcohol during her trip which is not typical for her.  She abstains from caffeine.    She had noticed that her rings felt tight and her head felt full.  She ate things like shrimp scampi, lasagna and boiled crab.    She has not had labs since 2023         Physical Exam:     Vitals: BP (!) 142/94 (BP Location: Right arm, Patient Position: Sitting, Cuff Size: Adult Regular)   Pulse 76   Ht 1.575 m (5' 2\")   Wt 65.8 kg (145 lb 1.6 oz)   SpO2 98%   BMI 26.54 kg/m    Constitutional:  cooperative, alert and oriented, well developed, well nourished, in no acute distress        Skin:  warm and dry to the touch, no apparent skin lesions or masses noted        Head:  normocephalic, no masses or lesions        Eyes:  pupils equal and round, conjunctivae and lids unremarkable, sclera white, no xanthalasma, EOMS intact, no nystagmus        ENT:  no pallor or cyanosis        Neck:  JVP normal        Chest:  normal symmetry        Cardiac: regular rhythm, normal S1 and S2, no murmurs, gallops or rubs detected                  Abdomen:  BS normoactive        Extremities and Back:  no deformities, clubbing, cyanosis, erythema observed, no edema        Neurological:  no gross motor deficits, affect appropriate                      Past Medical History:   I have reviewed this patient's past medical history  Past Medical History:   Diagnosis Date    Disorder of bone and cartilage, unspecified 9/23/2004    moderate osteopenia    Other abnormal glucose              Past Surgical History:   I have reviewed this patient's past surgical history  Past Surgical History:   Procedure Laterality Date    C MAMMOGRAM, SCREENING  6/2000    yearly watching one area, fibrocystic chnages    COLPOSCOPY,BX " CERVIX/ENDOCERV CURR  1979    HCL PAP SMEAR  7/2000    Dr Rendon    ZZC LIGATE FALLOPIAN TUBE,POSTPARTUM  1993    Tubal Ligation               Social History:   I have reviewed this patient's social history  Social History     Tobacco Use    Smoking status: Never    Smokeless tobacco: Never   Substance Use Topics    Alcohol use: Yes             Family History:   I have reviewed this patient's family history  Family History   Problem Relation Age of Onset    Diabetes Mother         type 2    Hypertension Mother     Osteoporosis Mother     Cerebrovascular Disease Mother         age 82    Hypertension Father     Diabetes Sister         type 2             Allergies:     Allergies   Allergen Reactions    Erythromycin              Medications:   I have reviewed this patient's current medications  Current Outpatient Medications   Medication Sig Dispense Refill    Calcium 200 MG TABS       carvedilol (COREG) 6.25 MG tablet Take 1 tablet (6.25 mg) by mouth 2 times daily (with meals). 180 tablet 3    fish oil-omega-3 fatty acids 1000 MG capsule Take 2 g by mouth daily      magnesium sulfate 3 g       Multiple Vitamin (MULTIVITAMIN ADULT PO)       spironolactone (ALDACTONE) 25 MG tablet Take 1 tablet (25 mg) by mouth daily. 90 tablet 3    vitamin D3 (CHOLECALCIFEROL) 1.25 MG (29166 UT) capsule Take 50,000 Units by mouth every 7 days      lactobacillus rhamnosus, GG, (CULTURELL) capsule Take 1 capsule by mouth 2 times daily                 Review of Systems:       Review of Systems:  Skin:  not assessed     Eyes:  not assessed    ENT:  not assessed    Respiratory:  Negative    Cardiovascular:    palpitations, Positive for, fatigue  Gastroenterology: not assessed    Genitourinary:  not assessed    Musculoskeletal:  not assessed    Neurologic:  not assessed    Psychiatric:  not assessed    Heme/Lymph/Imm:  not assessed    Endocrine:  not assessed                       Data:   All available laboratory data reviewed  Lab Results    Component Value Date    CHOL 200 08/07/2023    CHOL 185 08/19/2004     Lab Results   Component Value Date     08/07/2023    HDL 99 08/19/2004     Lab Results   Component Value Date    LDL 81 08/07/2023    LDL 75 08/19/2004     Lab Results   Component Value Date    TRIG 50 08/07/2023    TRIG 53 08/19/2004     Lab Results   Component Value Date    CHOLHDLRATIO 1.9 08/19/2004     TSH   Date Value Ref Range Status   08/07/2023 1.30 0.30 - 4.20 uIU/mL Final     Last Basic Metabolic Panel:  Lab Results   Component Value Date     08/07/2023      Lab Results   Component Value Date    POTASSIUM 4.5 08/07/2023     Lab Results   Component Value Date    CHLORIDE 98 08/07/2023     Lab Results   Component Value Date    JIM 9.2 08/07/2023     Lab Results   Component Value Date    CO2 26 08/07/2023     Lab Results   Component Value Date    BUN 10.0 08/07/2023     Lab Results   Component Value Date    CR 0.58 08/07/2023     Lab Results   Component Value Date    GLC 80 08/07/2023    GLC 95.8 08/18/2004     Lab Results   Component Value Date    WBC 5.4 08/07/2023     Lab Results   Component Value Date    RBC 4.16 08/07/2023     Lab Results   Component Value Date    HGB 12.9 08/07/2023    HGB 13.2 08/18/2004     Lab Results   Component Value Date    HCT 40.5 08/07/2023     Lab Results   Component Value Date    MCV 97 08/07/2023     Lab Results   Component Value Date    MCH 31.0 08/07/2023     Lab Results   Component Value Date    MCHC 31.9 08/07/2023     Lab Results   Component Value Date    RDW 14.3 08/07/2023     Lab Results   Component Value Date     08/07/2023

## 2025-01-29 NOTE — LETTER
1/29/2025    Cristi Rendon MD  [address]  [facility] [address]    RE: Kylie Kilgore       Dear Colleague,     I had the pleasure of seeing Kylie Kilgore in the [facility].  Cardiology Consultation      Kylie Kilgore MRN# [id number]   YOB: 1955 Age: 70 year old   Date of Visit 01/29/2025     Reason for consult: Paroxysmal atrial fibrillation           Assessment and Plan:     Paroxysmal atrial fibrillation in the setting of hypertension, salt loading and alcohol on vacation  Patient will continue to track her atrial fibrillation burden on her watch.  She will wear it at night as well.  Will start carvedilol and spironolactone.  Check labs today including basic metabolic panel and TSH.  Stress echocardiogram to rule out ischemia as patient did have some chest discomfort with her rapid atrial fibrillation.  Follow-up in 1 month.  May need anticoagulation if recurrence.      Hypertension, salt sensitive  Spironolactone and carvedilol for now.  Titrate as needed.      Coronary risk stratification  CT coronary calcification to see if statin indicated    I will review test results with patient in the interim.  Will have her follow-up with DARLENE in 1 month for further titration of antihypertensives and discussion of test results.      45 minutes spent today in review of past medical record, discussion with patient and postvisit charting    This note was transcribed using electronic voice recognition software, typographical errors may be present.    The longitudinal plan of care for the diagnosis(es)/condition(s) as documented were addressed during this visit. Due to the added complexity in care, I will continue to support Jaclyn in the subsequent management and with ongoing continuity of care.                  Chief Complaint:   No chief complaint on file.           History of Present Illness:   This patient is a very pleasant 70 year old female with family history of atrial  "fibrillation who comes in with hypertension after going to [place] and eating a lot of salty foods and having an episode of atrial fibrillation caught on her Apple watch that woke her up at 2 in the morning.  She does have GHANSHYAM and was compliant on her CPAP.    Heart rate was 122 bpm and it lasted for about 6 hours.  I reviewed the tracing from her phone and it does seem consistent with atrial fibrillation.  She had been drinking significant amount of alcohol during her trip which is not typical for her.  She abstains from caffeine.    She had noticed that her rings felt tight and her head felt full.  She ate things like shrimp scampi, lasagna and boiled crab.    She has not had labs since 2023         Physical Exam:     Vitals: BP (!) 142/94 (BP Location: Right arm, Patient Position: Sitting, Cuff Size: Adult Regular)   Pulse 76   Ht 1.575 m (5' 2\")   Wt 65.8 kg (145 lb 1.6 oz)   SpO2 98%   BMI 26.54 kg/m    Constitutional:  cooperative, alert and oriented, well developed, well nourished, in no acute distress        Skin:  warm and dry to the touch, no apparent skin lesions or masses noted        Head:  normocephalic, no masses or lesions        Eyes:  pupils equal and round, conjunctivae and lids unremarkable, sclera white, no xanthalasma, EOMS intact, no nystagmus        ENT:  no pallor or cyanosis        Neck:  JVP normal        Chest:  normal symmetry        Cardiac: regular rhythm, normal S1 and S2, no murmurs, gallops or rubs detected                  Abdomen:  BS normoactive        Extremities and Back:  no deformities, clubbing, cyanosis, erythema observed, no edema        Neurological:  no gross motor deficits, affect appropriate                      Past Medical History:   I have reviewed this patient's past medical history  Past Medical History:   Diagnosis Date     Disorder of bone and cartilage, unspecified 9/23/2004    moderate osteopenia     Other abnormal glucose              Past Surgical " History:   I have reviewed this patient's past surgical history  Past Surgical History:   Procedure Laterality Date     C MAMMOGRAM, SCREENING  6/2000    yearly watching one area, fibrocystic chnages     COLPOSCOPY,BX CERVIX/ENDOCERV CURR  1979     HCL PAP SMEAR  7/2000    Dr Rendon     [facility] LIGATE FALLOPIAN TUBE,POSTPARTUM  1993    Tubal Ligation               Social History:   I have reviewed this patient's social history  Social History     Tobacco Use     Smoking status: Never     Smokeless tobacco: Never   Substance Use Topics     Alcohol use: Yes             Family History:   I have reviewed this patient's family history  Family History   Problem Relation Age of Onset     Diabetes Mother         type 2     Hypertension Mother      Osteoporosis Mother      Cerebrovascular Disease Mother         age 82     Hypertension Father      Diabetes Sister         type 2             Allergies:     Allergies   Allergen Reactions     Erythromycin              Medications:   I have reviewed this patient's current medications  Current Outpatient Medications   Medication Sig Dispense Refill     Calcium 200 MG TABS        carvedilol (COREG) 6.25 MG tablet Take 1 tablet (6.25 mg) by mouth 2 times daily (with meals). 180 tablet 3     fish oil-omega-3 fatty acids 1000 MG capsule Take 2 g by mouth daily       magnesium sulfate 3 g        Multiple Vitamin (MULTIVITAMIN ADULT PO)        spironolactone (ALDACTONE) 25 MG tablet Take 1 tablet (25 mg) by mouth daily. 90 tablet 3     vitamin D3 (CHOLECALCIFEROL) 1.25 MG (64883 UT) capsule Take 50,000 Units by mouth every 7 days       lactobacillus rhamnosus, GG, (CULTURELL) capsule Take 1 capsule by mouth 2 times daily                 Review of Systems:       Review of Systems:  Skin:  not assessed     Eyes:  not assessed    ENT:  not assessed    Respiratory:  Negative    Cardiovascular:    palpitations, Positive for, fatigue  Gastroenterology: not assessed    Genitourinary:  not assessed     Musculoskeletal:  not assessed    Neurologic:  not assessed    Psychiatric:  not assessed    Heme/Lymph/Imm:  not assessed    Endocrine:  not assessed                       Data:   All available laboratory data reviewed  Lab Results   Component Value Date    CHOL 200 08/07/2023    CHOL 185 08/19/2004     Lab Results   Component Value Date     08/07/2023    HDL 99 08/19/2004     Lab Results   Component Value Date    LDL 81 08/07/2023    LDL 75 08/19/2004     Lab Results   Component Value Date    TRIG 50 08/07/2023    TRIG 53 08/19/2004     Lab Results   Component Value Date    CHOLHDLRATIO 1.9 08/19/2004     TSH   Date Value Ref Range Status   08/07/2023 1.30 0.30 - 4.20 uIU/mL Final     Last Basic Metabolic Panel:  Lab Results   Component Value Date     08/07/2023      Lab Results   Component Value Date    POTASSIUM 4.5 08/07/2023     Lab Results   Component Value Date    CHLORIDE 98 08/07/2023     Lab Results   Component Value Date    JIM 9.2 08/07/2023     Lab Results   Component Value Date    CO2 26 08/07/2023     Lab Results   Component Value Date    BUN 10.0 08/07/2023     Lab Results   Component Value Date    CR 0.58 08/07/2023     Lab Results   Component Value Date    GLC 80 08/07/2023    GLC 95.8 08/18/2004     Lab Results   Component Value Date    WBC 5.4 08/07/2023     Lab Results   Component Value Date    RBC 4.16 08/07/2023     Lab Results   Component Value Date    HGB 12.9 08/07/2023    HGB 13.2 08/18/2004     Lab Results   Component Value Date    HCT 40.5 08/07/2023     Lab Results   Component Value Date    MCV 97 08/07/2023     Lab Results   Component Value Date    MCH 31.0 08/07/2023     Lab Results   Component Value Date    MCHC 31.9 08/07/2023     Lab Results   Component Value Date    RDW 14.3 08/07/2023     Lab Results   Component Value Date     08/07/2023     Thank you for allowing me to participate in the care of your patient.      Sincerely,     Hector Reeves MD      [facility]  cc:   Referred Self, MD  No address on file

## 2025-02-06 ENCOUNTER — HOSPITAL ENCOUNTER (EMERGENCY)
Facility: CLINIC | Age: 70
Discharge: HOME | End: 2025-02-06
Attending: EMERGENCY MEDICINE
Payer: COMMERCIAL

## 2025-02-06 ENCOUNTER — APPOINTMENT (OUTPATIENT)
Dept: MRI IMAGING | Facility: CLINIC | Age: 70
End: 2025-02-06
Attending: EMERGENCY MEDICINE
Payer: COMMERCIAL

## 2025-02-06 VITALS
RESPIRATION RATE: 18 BRPM | OXYGEN SATURATION: 99 % | HEART RATE: 60 BPM | WEIGHT: 148.15 LBS | BODY MASS INDEX: 27.26 KG/M2 | DIASTOLIC BLOOD PRESSURE: 86 MMHG | TEMPERATURE: 97.6 F | HEIGHT: 62 IN | SYSTOLIC BLOOD PRESSURE: 148 MMHG

## 2025-02-06 DIAGNOSIS — H53.9 VISION CHANGES: ICD-10-CM

## 2025-02-06 DIAGNOSIS — R51.9 NONINTRACTABLE HEADACHE, UNSPECIFIED CHRONICITY PATTERN, UNSPECIFIED HEADACHE TYPE: ICD-10-CM

## 2025-02-06 DIAGNOSIS — Z86.79 HISTORY OF ATRIAL FIBRILLATION: ICD-10-CM

## 2025-02-06 LAB
ANION GAP SERPL CALCULATED.3IONS-SCNC: 11 MMOL/L (ref 7–15)
ATRIAL RATE - MUSE: 59 BPM
BASOPHILS # BLD AUTO: 0 10E3/UL (ref 0–0.2)
BASOPHILS NFR BLD AUTO: 1 %
BUN SERPL-MCNC: 15 MG/DL (ref 8–23)
CALCIUM SERPL-MCNC: 9.5 MG/DL (ref 8.8–10.4)
CHLORIDE SERPL-SCNC: 101 MMOL/L (ref 98–107)
CREAT SERPL-MCNC: 0.73 MG/DL (ref 0.51–0.95)
DIASTOLIC BLOOD PRESSURE - MUSE: NORMAL MMHG
EGFRCR SERPLBLD CKD-EPI 2021: 87 ML/MIN/1.73M2
EOSINOPHIL # BLD AUTO: 0.1 10E3/UL (ref 0–0.7)
EOSINOPHIL NFR BLD AUTO: 2 %
ERYTHROCYTE [DISTWIDTH] IN BLOOD BY AUTOMATED COUNT: 13.6 % (ref 10–15)
GLUCOSE SERPL-MCNC: 91 MG/DL (ref 70–99)
HCO3 SERPL-SCNC: 27 MMOL/L (ref 22–29)
HCT VFR BLD AUTO: 39.3 % (ref 35–47)
HGB BLD-MCNC: 12.9 G/DL (ref 11.7–15.7)
HOLD SPECIMEN: NORMAL
HOLD SPECIMEN: NORMAL
IMM GRANULOCYTES # BLD: 0 10E3/UL
IMM GRANULOCYTES NFR BLD: 0 %
INTERPRETATION ECG - MUSE: NORMAL
LYMPHOCYTES # BLD AUTO: 2.1 10E3/UL (ref 0.8–5.3)
LYMPHOCYTES NFR BLD AUTO: 39 %
MCH RBC QN AUTO: 30.9 PG (ref 26.5–33)
MCHC RBC AUTO-ENTMCNC: 32.8 G/DL (ref 31.5–36.5)
MCV RBC AUTO: 94 FL (ref 78–100)
MONOCYTES # BLD AUTO: 0.6 10E3/UL (ref 0–1.3)
MONOCYTES NFR BLD AUTO: 10 %
NEUTROPHILS # BLD AUTO: 2.7 10E3/UL (ref 1.6–8.3)
NEUTROPHILS NFR BLD AUTO: 49 %
NRBC # BLD AUTO: 0 10E3/UL
NRBC BLD AUTO-RTO: 0 /100
P AXIS - MUSE: 41 DEGREES
PLATELET # BLD AUTO: 290 10E3/UL (ref 150–450)
POTASSIUM SERPL-SCNC: 4.2 MMOL/L (ref 3.4–5.3)
PR INTERVAL - MUSE: 160 MS
QRS DURATION - MUSE: 78 MS
QT - MUSE: 426 MS
QTC - MUSE: 421 MS
R AXIS - MUSE: 25 DEGREES
RBC # BLD AUTO: 4.17 10E6/UL (ref 3.8–5.2)
SODIUM SERPL-SCNC: 139 MMOL/L (ref 135–145)
SYSTOLIC BLOOD PRESSURE - MUSE: NORMAL MMHG
T AXIS - MUSE: 24 DEGREES
VENTRICULAR RATE- MUSE: 59 BPM
WBC # BLD AUTO: 5.6 10E3/UL (ref 4–11)

## 2025-02-06 PROCEDURE — 93005 ELECTROCARDIOGRAM TRACING: CPT

## 2025-02-06 PROCEDURE — 36415 COLL VENOUS BLD VENIPUNCTURE: CPT | Performed by: EMERGENCY MEDICINE

## 2025-02-06 PROCEDURE — 82310 ASSAY OF CALCIUM: CPT | Performed by: EMERGENCY MEDICINE

## 2025-02-06 PROCEDURE — 85014 HEMATOCRIT: CPT | Performed by: EMERGENCY MEDICINE

## 2025-02-06 PROCEDURE — 70544 MR ANGIOGRAPHY HEAD W/O DYE: CPT

## 2025-02-06 PROCEDURE — 82374 ASSAY BLOOD CARBON DIOXIDE: CPT | Performed by: EMERGENCY MEDICINE

## 2025-02-06 PROCEDURE — 99285 EMERGENCY DEPT VISIT HI MDM: CPT | Mod: 25

## 2025-02-06 PROCEDURE — 70549 MR ANGIOGRAPH NECK W/O&W/DYE: CPT

## 2025-02-06 PROCEDURE — 80048 BASIC METABOLIC PNL TOTAL CA: CPT | Performed by: EMERGENCY MEDICINE

## 2025-02-06 PROCEDURE — 70553 MRI BRAIN STEM W/O & W/DYE: CPT

## 2025-02-06 PROCEDURE — 85049 AUTOMATED PLATELET COUNT: CPT | Performed by: EMERGENCY MEDICINE

## 2025-02-06 PROCEDURE — A9585 GADOBUTROL INJECTION: HCPCS | Performed by: EMERGENCY MEDICINE

## 2025-02-06 PROCEDURE — 255N000002 HC RX 255 OP 636: Performed by: EMERGENCY MEDICINE

## 2025-02-06 PROCEDURE — 85004 AUTOMATED DIFF WBC COUNT: CPT | Performed by: EMERGENCY MEDICINE

## 2025-02-06 RX ORDER — GADOBUTROL 604.72 MG/ML
10 INJECTION INTRAVENOUS ONCE
Status: COMPLETED | OUTPATIENT
Start: 2025-02-06 | End: 2025-02-06

## 2025-02-06 RX ADMIN — GADOBUTROL 10 ML: 604.72 INJECTION INTRAVENOUS at 17:26

## 2025-02-06 ASSESSMENT — ACTIVITIES OF DAILY LIVING (ADL)
ADLS_ACUITY_SCORE: 41

## 2025-02-06 ASSESSMENT — COLUMBIA-SUICIDE SEVERITY RATING SCALE - C-SSRS
2. HAVE YOU ACTUALLY HAD ANY THOUGHTS OF KILLING YOURSELF IN THE PAST MONTH?: NO
1. IN THE PAST MONTH, HAVE YOU WISHED YOU WERE DEAD OR WISHED YOU COULD GO TO SLEEP AND NOT WAKE UP?: NO
6. HAVE YOU EVER DONE ANYTHING, STARTED TO DO ANYTHING, OR PREPARED TO DO ANYTHING TO END YOUR LIFE?: NO

## 2025-02-06 NOTE — ED PROVIDER NOTES
Emergency Department Note      History of Present Illness     Chief Complaint   Eye Problem    HPI   Kylie Kilgore is a 70 year old female with history of atrial fibrillation and hypertension who presents to the ED with her  for evaluation of an eye problem. The patient reports that today around 1300 she had sudden onset horizontal diplopia that lasted for about 20 minutes along with a pressure headache that radiates to her neck and has continued to persist. She states that she tried covering one eye and that her vision changes still persisted. She denies any novel weakness, numbness, chest pain, shortness of breath, recent illness, fever, vomiting, changes in speech, or recent falls or trauma. Of note, she reports that she has had some numbness in her right thigh for the past month. Additionally, she states that she recently saw a cardiologist and was prescribed metoprolol and spirolactone. Denies any smoking.     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  Uterine prolapse  Vaginal wall prolapse  Atrial fibrillation  Hypertension     Medications   carvedilol (COREG) 6.25 MG tablet  lactobacillus rhamnosus, GG, (CULTURELL) capsule  magnesium sulfate 3 g  spironolactone (ALDACTONE) 25 MG tablet  Metoprolol     Surgical History   Past Surgical History:   Procedure Laterality Date    C MAMMOGRAM, SCREENING  6/2000    yearly watching one area, fibrocystic chnages    COLPOSCOPY,BX CERVIX/ENDOCERV CURR  1979    HCL PAP SMEAR  7/2000    Dr Rendon    [facility] LIGATE FALLOPIAN TUBE,POSTPARTUM  1993    Tubal Ligation     Physical Exam     Patient Vitals for the past 24 hrs:   BP Temp Pulse Resp SpO2 Height Weight   02/06/25  "1857 (!) 148/86 -- 60 18 99 % -- --   02/06/25 1344 (!) 159/106 97.6  F (36.4  C) 60 18 96 % 1.575 m (5' 2\") 67.2 kg (148 lb 2.4 oz)     Physical Exam  GENERAL: Awake, alert  CARDIOVASCULAR: Regular rate and rhythm  LUNGS: Clear bilaterally, no wheezes rales or rhonchi  EXTREMITIES: No peripheral edema  NEURO: Awake and alert, moves all extremities  Diagnostics     Lab Results   Labs Ordered and Resulted from Time of ED Arrival to Time of ED Departure   BASIC METABOLIC PANEL - Normal       Result Value    Sodium 139      Potassium 4.2      Chloride 101      Carbon Dioxide (CO2) 27      Anion Gap 11      Urea Nitrogen 15.0      Creatinine 0.73      GFR Estimate 87      Calcium 9.5      Glucose 91     CBC WITH PLATELETS AND DIFFERENTIAL    WBC Count 5.6      RBC Count 4.17      Hemoglobin 12.9      Hematocrit 39.3      MCV 94      MCH 30.9      MCHC 32.8      RDW 13.6      Platelet Count 290      % Neutrophils 49      % Lymphocytes 39      % Monocytes 10      % Eosinophils 2      % Basophils 1      % Immature Granulocytes 0      NRBCs per 100 WBC 0      Absolute Neutrophils 2.7      Absolute Lymphocytes 2.1      Absolute Monocytes 0.6      Absolute Eosinophils 0.1      Absolute Basophils 0.0      Absolute Immature Granulocytes 0.0      Absolute NRBCs 0.0         Imaging   MR Brain w/o & w Contrast   Final Result   IMPRESSION:   HEAD MRI:    1.  No acute intracranial process.   2.  Mild presumed senescent changes as detailed above.      HEAD MRA:    1.  No aneurysm, high flow AVM or significant stenosis identified.      NECK MRA:   1.  No hemodynamically significant stenosis in the neck vessels.   2.  No evidence for dissection.      MRA Angiogram Head w/o Contrast   Final Result   IMPRESSION:   HEAD MRI:    1.  No acute intracranial process.   2.  Mild presumed senescent changes as detailed above.      HEAD MRA:    1.  No aneurysm, high flow AVM or significant stenosis identified.      NECK MRA:   1.  No " hemodynamically significant stenosis in the neck vessels.   2.  No evidence for dissection.      MRA Angiogram Neck w/o & w Contrast   Final Result   IMPRESSION:   HEAD MRI:    1.  No acute intracranial process.   2.  Mild presumed senescent changes as detailed above.      HEAD MRA:    1.  No aneurysm, high flow AVM or significant stenosis identified.      NECK MRA:   1.  No hemodynamically significant stenosis in the neck vessels.   2.  No evidence for dissection.          EKG   ECG taken at 1422, ECG read at 1620  Sinus bradycardia  Nonspecific T wave abnormality   No change as compared to prior, dated 7/19/21.  Rate 59 bpm. KY interval 160 ms. QRS duration 78 ms. QT/QTc 426/421 ms. P-R-T axes 41 25 24.    Independent Interpretation   None    ED Course      Medications Administered   Medications   gadobutrol (GADAVIST) injection 10 mL (10 mLs Intravenous $Given 2/6/25 1726)   sodium chloride (PF) 0.9% PF flush 60 mL (60 mLs Intravenous $Given 2/6/25 1726)       Procedures   Procedures     Discussion of Management   None    ED Course   ED Course as of 02/06/25 1858   u Feb 06, 2025   1609 I obtained history and examined the patient as noted above.    1850 I rechecked and updated the patient.        Additional Documentation  None    Medical Decision Making / Diagnosis     CMS Diagnoses: None    MIPS       None    [facility]   Kylie Kilgore is a 70 year old female ***    Disposition   The patient was discharged.     Diagnosis     ICD-10-CM    1. Vision changes  H53.9       2. Nonintractable headache, unspecified chronicity pattern, unspecified headache type  R51.9            Discharge Medications   New Prescriptions    No medications on file     Scribe Disclosure:  I, Bjorn Kulkarni, am serving as a scribe at 4:08 PM on 2/6/2025 to document services personally performed by Yeimy Leavitt MD based on my observations and the provider's statements to me.      diplopia.  Symptoms were somewhat atypical for stroke as patient's vision symptoms still persisted when she covered 1 eye.  Patient's symptoms have completely resolved at this point.  No eye pain just ocular pathology.  Given new onset headache along with recently diagnosed atrial fibrillation I did consider possible stroke or TIA.  Therefore MRI was performed which showed no critical stenosis or CVA.  Patient was reassured.  She does have a [id number] of 3 and we did discuss her annual stroke risk of 3 to 4.5% in possibility of starting her on a DOAC.  After discussion including risks of bleeding, I sent her prescription for Eliquis.  She will follow-up with her call radiologist next week.  I also considered possible migraine given patient's headache with vision changes, she can follow-up with neurology as well and EKG today showed sinus rhythm.  Patient was initially hypertensive but this improved before discharge.    Disposition   The patient was discharged.     Diagnosis     ICD-10-CM    1. Vision changes  H53.9       2. Nonintractable headache, unspecified chronicity pattern, unspecified headache type  R51.9            Discharge Medications   New Prescriptions    No medications on file     Scribe Disclosure:  I, Bjorn Kulkarni, am serving as a scribe at 4:08 PM on 2/6/2025 to document services personally performed by Yeimy Leavitt MD based on my observations and the provider's statements to me.        Yeimy Leavitt MD  02/07/25 0786

## 2025-02-06 NOTE — ED TRIAGE NOTES
New afib that started about a week ago. Today around 1300 sudden blurred vision and HA that lasted about 20 minutes. Blurred vision is now gone but continues to have HA. States she had a BP of 183/99.

## 2025-02-10 ENCOUNTER — HOSPITAL ENCOUNTER (OUTPATIENT)
Dept: CT IMAGING | Facility: CLINIC | Age: 70
Discharge: HOME | End: 2025-02-10
Attending: INTERNAL MEDICINE
Payer: COMMERCIAL

## 2025-02-10 ENCOUNTER — HOSPITAL ENCOUNTER (OUTPATIENT)
Dept: CARDIOLOGY | Facility: CLINIC | Age: 70
Discharge: HOME | End: 2025-02-10
Attending: INTERNAL MEDICINE
Payer: COMMERCIAL

## 2025-02-10 DIAGNOSIS — R94.31 ABNORMAL ELECTROCARDIOGRAM: ICD-10-CM

## 2025-02-10 DIAGNOSIS — I48.0 PAF (PAROXYSMAL ATRIAL FIBRILLATION) (H): ICD-10-CM

## 2025-02-10 DIAGNOSIS — G47.33 OSA (OBSTRUCTIVE SLEEP APNEA): ICD-10-CM

## 2025-02-10 PROCEDURE — 75571 CT HRT W/O DYE W/CA TEST: CPT | Mod: 26 | Performed by: INTERNAL MEDICINE

## 2025-02-10 PROCEDURE — 93018 CV STRESS TEST I&R ONLY: CPT | Performed by: INTERNAL MEDICINE

## 2025-02-10 PROCEDURE — 255N000002 HC RX 255 OP 636: Performed by: INTERNAL MEDICINE

## 2025-02-10 PROCEDURE — 93350 STRESS TTE ONLY: CPT | Mod: 26 | Performed by: INTERNAL MEDICINE

## 2025-02-10 PROCEDURE — 93016 CV STRESS TEST SUPVJ ONLY: CPT | Performed by: INTERNAL MEDICINE

## 2025-02-10 PROCEDURE — 93325 DOPPLER ECHO COLOR FLOW MAPG: CPT | Mod: 26 | Performed by: INTERNAL MEDICINE

## 2025-02-10 PROCEDURE — 93325 DOPPLER ECHO COLOR FLOW MAPG: CPT | Mod: TC

## 2025-02-10 PROCEDURE — 93321 DOPPLER ECHO F-UP/LMTD STD: CPT | Mod: 26 | Performed by: INTERNAL MEDICINE

## 2025-02-10 PROCEDURE — 75571 CT HRT W/O DYE W/CA TEST: CPT

## 2025-02-10 RX ADMIN — HUMAN ALBUMIN MICROSPHERES AND PERFLUTREN 3 ML: 10; .22 INJECTION, SOLUTION INTRAVENOUS at 13:24

## 2025-03-16 NOTE — PROGRESS NOTES
Cardiology Clinic Progress Note  Kylie Kilgore MRN# [id number]   YOB: 1955 Age: 70 year old   Primary Cardiologist: Dr. Reeves Reason for visit: 1 month follow up             Assessment and Plan:   Kylie Kilgore is a 70 year old female who is here today for 1 month follow up      1.  Paroxysmal atrial fibrillation -[id number] score of 3.  On Eliquis.  Appears to be in sinus rhythm today.  Apple Watch tracings without reoccurrence of atrial fibrillation.  Continue carvedilol 6.25 mg twice daily for rate control.     2.  Hypertension - BP today 156/90, noted patient did not take her medications prior to visit. Home trends 120-130/70-80. Continue carvedilol 6.25 mg twice daily and spironolactone 25 mg daily.       Changes today:   - Blood pressure is elevated today though patient did not take her medications prior to appointment. She does keep a log at home in which BP trends are 120-130/70-80s with current medications. Will continue carvedilol and spironolactone at this time and discussed with patient to continue to log home blood pressures. If she notices blood pressure trends are elevated to contact cardiology. Would consider increasing spironolactone or adding ARB as patient's HR limits increasing carvedilol.     - Discussed importance of taking Eliquis for anticoagulation for atrial fibrillation. She has not noticed reoccurrences on apple watch or symptoms similar to her episode of vacation.     Follow up plan:   - Follow up in 6 months or sooner if needed       Nikole Land PA-C  [facility]        History of Presenting Illness:    Kylie Kilgore is a very pleasant 70 year old female with paroxysmal atrial fibrillation (Eliquis), hypertension, and obstructive sleep apnea (compliant with CPAP).    Patient was initially seen by Dr. Romano in 2021 for an abnormal ECG.  ECG was repeated in the office the day of visit which showed normal sinus rhythm with nonspecific T wave  changes and delayed R wave progression.  At this time it was recommended she obtain an exercise stress echocardiogram which showed normal stress echocardiogram with no evidence of stress-induced ischemia, LVEF estimated 65-70%, Duke treadmill score intermediate risk, good to average exercise tolerance for age 10.1 METS.  She saw Dr. Reeves 1/29/2025 in which she had noted her smart watch showed atrial fibrillation that woke her up during her vacation in [place].  The heart rate was noted to be 122 bpm and it lasted about 6 hours.  The tracings from her phone was reviewed during the visit and it appeared to be consistent with atrial fibrillation.  She had noted during vacation that she had been eating a lot of salty foods and drinking significant amount of alcohol which is not typical for her.  It was recommended during that time that she continue to track her atrial fibrillation burden on her watch.  Additionally she was recommended stress echocardiogram and CT calcium score, along with being started on carvedilol and spironolactone.  CT calcium score showed a total Agatston calcium score of 0.  Stress echocardiogram was without evidence of stress-induced ischemia, LVEF estimated at 55-60%, stress EKG nondiagnostic due to pre-existing EKG abnormalities inferior and lateral ST wave changes worse with stress, 8 METS.     Patient was seen in the ER 2/6/2025 with a chief complaint of sudden onset horizontal diplopia that lasted about 20 minutes along with a headache.  EKG showed sinus bradycardia.  Given that she has a history of atrial fibrillation without anticoagulation MRI was recommended which was without acute intracranial process, no significant stenosis of neck vessels.  During this ED encounter it was recommended that she was to be started on Eliquis and recommended follow-up with neurology given vision changes and headache.  She presents today for testing follow-up.    Patient reports feeling well today. She  denies any additional episodes of atrial fibrillation similar to on vacation. Her apple watch has not shown reoccurrence. She denies any lightheadedness, palpitations, dizziness, or shortness of breath. She did have an episode of dizziness and double vision while working. At this time she went to the ED. No reoccurrence since then.     Blood pressure 156/90 and HR 56 in clinic today. (Of note patient did not take her medications prior to coming to clinic). Blood pressure at home 120-130s/70-80s.     Testing Reviewed  FLP 1/29/2025-, , , TG 70  BMP 2/6/2025-sodium 139, potassium 4.2, stable renal function compared to prior  CBC 2/6/2025-Hgb 12.9,   EKG 2/6/2025, stress echocardiogram 2/10/2025, CT coronary calcium score 2/10/2025      Social History       Social History     Socioeconomic History    Marital status:      Spouse name: Not on file    Number of children: 3    Years of education: Not on file    Highest education level: Not on file   Occupational History    Not on file   Tobacco Use    Smoking status: Never    Smokeless tobacco: Never   Substance and Sexual Activity    Alcohol use: Yes    Drug use: No    Sexual activity: Yes     Birth control/protection: Surgical     Comment: tubal   Other Topics Concern     Service No    Blood Transfusions No    Caffeine Concern Yes     Comment: 2 cups coffee    Occupational Exposure Yes    Hobby Hazards No    Sleep Concern No    Stress Concern No    Weight Concern No    Special Diet No    Back Care No    Exercise Yes     Comment: walker    Bike Helmet Not Asked    Seat Belt Yes    Self-Exams Not Asked    Parent/sibling w/ CABG, MI or angioplasty before 65F 55M? Not Asked   Social History Narrative    Not on file     Social Drivers of Health     Financial Resource Strain: Low Risk  (2/1/2025)    Received from [employer] [facility] [employer]    Financial Resource Strain     Difficulty of Paying Living Expenses: 3      Difficulty of Paying Living Expenses: Not on file   Food Insecurity: No Food Insecurity (5/22/2024)    Received from [employer]    Food Insecurity     Do you worry your food will run out before you are able to buy more?: 1   Transportation Needs: No Transportation Needs (5/22/2024)    [facility] [employer] [facility]    Transportation Needs     Does lack of transportation keep you from medical appointments?: 1     Does lack of transportation keep you from work, meetings or getting things that you need?: 1   Physical Activity: Not on file   Stress: Not on file   Social Connections: Socially Integrated (5/22/2024)    Received from [employer] [facility]    Social Connections     Do you often feel lonely or isolated from those around you?: 0   Interpersonal Safety: Not on file   Housing Stability: Low Risk  (5/22/2024)    [facility] [employer] [facility]    Housing Stability     What is your housing situation today?: 1            Review of Systems:   Please see HPI         Physical Exam:   Vitals: There were no vitals taken for this visit.   Wt Readings from Last 4 Encounters:   02/06/25 67.2 kg (148 lb 2.4 oz)   01/29/25 65.8 kg (145 lb 1.6 oz)   08/20/24 64.9 kg (143 lb)   01/17/24 65.3 kg (144 lb)     GEN: well nourished, in no acute distress.  HEENT:  Pupils equal, round. Sclerae nonicteric.   NECK: Supple, no masses appreciated. No JVD with patient.  C/V:  Regular rate and rhythm, no murmur, rub or gallop.    RESP: Respirations are unlabored. Clear to auscultation bilaterally without wheezing, rales, or rhonchi.  GI: Abdomen soft, nontender.  EXTREM: No LE edema.  NEURO: Alert and oriented, cooperative.  SKIN: Warm and dry.        Data:   LIPID RESULTS:  Lab Results   Component Value Date    CHOL 234 (H) 01/29/2025    CHOL 185 08/19/2004     01/29/2025    HDL 99 08/19/2004     (H) 01/29/2025     "LDL 75 08/19/2004    TRIG 70 01/29/2025    TRIG 53 08/19/2004    CHOLHDLRATIO 1.9 08/19/2004     LIVER ENZYME RESULTS:  Lab Results   Component Value Date    AST 35 08/07/2023    ALT 33 01/29/2025     CBC RESULTS:  Lab Results   Component Value Date    WBC 5.6 02/06/2025    RBC 4.17 02/06/2025    HGB 12.9 02/06/2025    HGB 13.2 08/18/2004    HCT 39.3 02/06/2025    MCV 94 02/06/2025    MCH 30.9 02/06/2025    MCHC 32.8 02/06/2025    RDW 13.6 02/06/2025     02/06/2025     BMP RESULTS:  Lab Results   Component Value Date     02/06/2025    POTASSIUM 4.2 02/06/2025    CHLORIDE 101 02/06/2025    CO2 27 02/06/2025    ANIONGAP 11 02/06/2025    GLC 91 02/06/2025    GLC 95.8 08/18/2004    BUN 15.0 02/06/2025    CR 0.73 02/06/2025    GFRESTIMATED 87 02/06/2025    JIM 9.5 02/06/2025      A1C RESULTS:  Lab Results   Component Value Date    A1C 5.8 (H) 08/07/2023     INR RESULTS:  No results found for: \"INR\"         Medications     Current Outpatient Medications   Medication Sig Dispense Refill    apixaban ANTICOAGULANT (ELIQUIS ANTICOAGULANT) 5 MG tablet Take 1 tablet (5 mg) by mouth 2 times daily. 60 tablet 0    Calcium 200 MG TABS       carvedilol (COREG) 6.25 MG tablet Take 1 tablet (6.25 mg) by mouth 2 times daily (with meals). 180 tablet 3    fish oil-omega-3 fatty acids 1000 MG capsule Take 2 g by mouth daily      lactobacillus rhamnosus, GG, (CULTURELL) capsule Take 1 capsule by mouth 2 times daily      magnesium sulfate 3 g       Multiple Vitamin (MULTIVITAMIN ADULT PO)       spironolactone (ALDACTONE) 25 MG tablet Take 1 tablet (25 mg) by mouth daily. 90 tablet 3    vitamin D3 (CHOLECALCIFEROL) 1.25 MG (27521 UT) capsule Take 50,000 Units by mouth every 7 days            Past Medical History     Past Medical History:   Diagnosis Date    Disorder of bone and cartilage, unspecified 9/23/2004    moderate osteopenia    Other abnormal glucose      Past Surgical History:   Procedure Laterality Date    C MAMMOGRAM, " SCREENING  6/2000    yearly watching one area, fibrocystic chnages    COLPOSCOPY,BX CERVIX/ENDOCERV CURR  1979    HCL PAP SMEAR  7/2000    Dr Rendon    [facility] LIGATE FALLOPIAN TUBE,POSTPARTUM  1993    Tubal Ligation     Family History   Problem Relation Age of Onset    Diabetes Mother         type 2    Hypertension Mother     Osteoporosis Mother     Cerebrovascular Disease Mother         age 82    Hypertension Father     Diabetes Sister         type 2            Allergies   Erythromycin    35 minutes spent on the date of the encounter doing chart review, history and exam, documentation and further activities as noted above

## 2025-03-17 ENCOUNTER — OFFICE VISIT (OUTPATIENT)
Dept: CARDIOLOGY | Facility: CLINIC | Age: 70
End: 2025-03-17
Attending: INTERNAL MEDICINE
Payer: COMMERCIAL

## 2025-03-17 VITALS
SYSTOLIC BLOOD PRESSURE: 156 MMHG | BODY MASS INDEX: 26.63 KG/M2 | DIASTOLIC BLOOD PRESSURE: 90 MMHG | WEIGHT: 144.7 LBS | HEART RATE: 56 BPM | HEIGHT: 62 IN

## 2025-03-17 DIAGNOSIS — R94.31 ABNORMAL ELECTROCARDIOGRAM: ICD-10-CM

## 2025-03-17 DIAGNOSIS — I48.0 PAF (PAROXYSMAL ATRIAL FIBRILLATION) (H): ICD-10-CM

## 2025-03-17 DIAGNOSIS — G47.33 OSA (OBSTRUCTIVE SLEEP APNEA): ICD-10-CM

## 2025-03-17 NOTE — LETTER
3/17/2025    Cristi Rendon MD  [address]  [facility] [address]    RE: Kylie Kilgore       Dear Colleague,     I had the pleasure of seeing Kylie Kilgore in the [facility].  Cardiology Clinic Progress Note  Kylie Kilgore MRN# [id number]   YOB: 1955 Age: 70 year old   Primary Cardiologist: Dr. Reeves Reason for visit: 1 month follow up             Assessment and Plan:   Kylie Kilgore is a 70 year old female who is here today for 1 month follow up      1.  Paroxysmal atrial fibrillation -[id number] score of 3.  On Eliquis.  Appears to be in sinus rhythm today.  Apple Watch tracings without reoccurrence of atrial fibrillation.  Continue carvedilol 6.25 mg twice daily for rate control.     2.  Hypertension - BP today 156/90, noted patient did not take her medications prior to visit. Home trends 120-130/70-80. Continue carvedilol 6.25 mg twice daily and spironolactone 25 mg daily.       Changes today:   - Blood pressure is elevated today though patient did not take her medications prior to appointment. She does keep a log at home in which BP trends are 120-130/70-80s with current medications. Will continue carvedilol and spironolactone at this time and discussed with patient to continue to log home blood pressures. If she notices blood pressure trends are elevated to contact cardiology. Would consider increasing spironolactone or adding ARB as patient's HR limits increasing carvedilol.     - Discussed importance of taking Eliquis for anticoagulation for atrial fibrillation. She has not noticed reoccurrences on apple watch or symptoms similar to her episode of vacation.     Follow up plan:   - Follow up in 6 months or sooner if needed       Nikole Land PA-C  [facility] - [facility]        History of Presenting Illness:    Kylie Kilgore is a very pleasant 70 year old female with paroxysmal atrial fibrillation (Eliquis), hypertension, and obstructive sleep apnea  (compliant with CPAP).    Patient was initially seen by Dr. Romano in 2021 for an abnormal ECG.  ECG was repeated in the office the day of visit which showed normal sinus rhythm with nonspecific T wave changes and delayed R wave progression.  At this time it was recommended she obtain an exercise stress echocardiogram which showed normal stress echocardiogram with no evidence of stress-induced ischemia, LVEF estimated 65-70%, Duke treadmill score intermediate risk, good to average exercise tolerance for age 10.1 METS.  She saw Dr. Reeves 1/29/2025 in which she had noted her smart watch showed atrial fibrillation that woke her up during her vacation in [place].  The heart rate was noted to be 122 bpm and it lasted about 6 hours.  The tracings from her phone was reviewed during the visit and it appeared to be consistent with atrial fibrillation.  She had noted during vacation that she had been eating a lot of salty foods and drinking significant amount of alcohol which is not typical for her.  It was recommended during that time that she continue to track her atrial fibrillation burden on her watch.  Additionally she was recommended stress echocardiogram and CT calcium score, along with being started on carvedilol and spironolactone.  CT calcium score showed a total Agatston calcium score of 0.  Stress echocardiogram was without evidence of stress-induced ischemia, LVEF estimated at 55-60%, stress EKG nondiagnostic due to pre-existing EKG abnormalities inferior and lateral ST wave changes worse with stress, 8 METS.     Patient was seen in the ER 2/6/2025 with a chief complaint of sudden onset horizontal diplopia that lasted about 20 minutes along with a headache.  EKG showed sinus bradycardia.  Given that she has a history of atrial fibrillation without anticoagulation MRI was recommended which was without acute intracranial process, no significant stenosis of neck vessels.  During this ED encounter it was recommended  that she was to be started on Eliquis and recommended follow-up with neurology given vision changes and headache.  She presents today for testing follow-up.    Patient reports feeling well today. She denies any additional episodes of atrial fibrillation similar to on vacation. Her apple watch has not shown reoccurrence. She denies any lightheadedness, palpitations, dizziness, or shortness of breath. She did have an episode of dizziness and double vision while working. At this time she went to the ED. No reoccurrence since then.     Blood pressure 156/90 and HR 56 in clinic today. (Of note patient did not take her medications prior to coming to clinic). Blood pressure at home 120-130s/70-80s.     Testing Reviewed  FLP 1/29/2025-, , , TG 70  BMP 2/6/2025-sodium 139, potassium 4.2, stable renal function compared to prior  CBC 2/6/2025-Hgb 12.9,   EKG 2/6/2025, stress echocardiogram 2/10/2025, CT coronary calcium score 2/10/2025      Social History       Social History     Socioeconomic History     Marital status:      Spouse name: Not on file     Number of children: 3     Years of education: Not on file     Highest education level: Not on file   Occupational History     Not on file   Tobacco Use     Smoking status: Never     Smokeless tobacco: Never   Substance and Sexual Activity     Alcohol use: Yes     Drug use: No     Sexual activity: Yes     Birth control/protection: Surgical     Comment: tubal   Other Topics Concern      Service No     Blood Transfusions No     Caffeine Concern Yes     Comment: 2 cups coffee     Occupational Exposure Yes     Hobby Hazards No     Sleep Concern No     Stress Concern No     Weight Concern No     Special Diet No     Back Care No     Exercise Yes     Comment: walker     Bike Helmet Not Asked     Seat Belt Yes     Self-Exams Not Asked     Parent/sibling w/ CABG, MI or angioplasty before 65F 55M? Not Asked   Social History Narrative     Not on  file     Social Drivers of Health     Financial Resource Strain: Low Risk  (2/1/2025)    Received from [employer] [facility]    Financial Resource Strain      Difficulty of Paying Living Expenses: 3      Difficulty of Paying Living Expenses: Not on file   Food Insecurity: No Food Insecurity (5/22/2024)    Received from [employer] [facility]    Food Insecurity      Do you worry your food will run out before you are able to buy more?: 1   Transportation Needs: No Transportation Needs (5/22/2024)    Received from [employer] [facility]    Transportation Needs      Does lack of transportation keep you from medical appointments?: 1      Does lack of transportation keep you from work, meetings or getting things that you need?: 1   Physical Activity: Not on file   Stress: Not on file   Social Connections: Socially Integrated (5/22/2024)    Received from [employer] [facility]    Social Connections      Do you often feel lonely or isolated from those around you?: 0   Interpersonal Safety: Not on file   Housing Stability: Low Risk  (5/22/2024)    Received from [employer]    Housing Stability      What is your housing situation today?: 1            Review of Systems:   Please see HPI         Physical Exam:   Vitals: There were no vitals taken for this visit.   Wt Readings from Last 4 Encounters:   02/06/25 67.2 kg (148 lb 2.4 oz)   01/29/25 65.8 kg (145 lb 1.6 oz)   08/20/24 64.9 kg (143 lb)   01/17/24 65.3 kg (144 lb)     GEN: well nourished, in no acute distress.  HEENT:  Pupils equal, round. Sclerae nonicteric.   NECK: Supple, no masses appreciated. No JVD with patient.  C/V:  Regular rate and rhythm, no murmur, rub or gallop.    RESP: Respirations are unlabored. Clear to auscultation bilaterally without wheezing, rales, or rhonchi.  GI: Abdomen soft, nontender.  EXTREM: No LE edema.  NEURO: Alert and  "oriented, cooperative.  SKIN: Warm and dry.        Data:   LIPID RESULTS:  Lab Results   Component Value Date    CHOL 234 (H) 01/29/2025    CHOL 185 08/19/2004     01/29/2025    HDL 99 08/19/2004     (H) 01/29/2025    LDL 75 08/19/2004    TRIG 70 01/29/2025    TRIG 53 08/19/2004    CHOLHDLRATIO 1.9 08/19/2004     LIVER ENZYME RESULTS:  Lab Results   Component Value Date    AST 35 08/07/2023    ALT 33 01/29/2025     CBC RESULTS:  Lab Results   Component Value Date    WBC 5.6 02/06/2025    RBC 4.17 02/06/2025    HGB 12.9 02/06/2025    HGB 13.2 08/18/2004    HCT 39.3 02/06/2025    MCV 94 02/06/2025    MCH 30.9 02/06/2025    MCHC 32.8 02/06/2025    RDW 13.6 02/06/2025     02/06/2025     BMP RESULTS:  Lab Results   Component Value Date     02/06/2025    POTASSIUM 4.2 02/06/2025    CHLORIDE 101 02/06/2025    CO2 27 02/06/2025    ANIONGAP 11 02/06/2025    GLC 91 02/06/2025    GLC 95.8 08/18/2004    BUN 15.0 02/06/2025    CR 0.73 02/06/2025    GFRESTIMATED 87 02/06/2025    JIM 9.5 02/06/2025      A1C RESULTS:  Lab Results   Component Value Date    A1C 5.8 (H) 08/07/2023     INR RESULTS:  No results found for: \"INR\"         Medications     Current Outpatient Medications   Medication Sig Dispense Refill     apixaban ANTICOAGULANT (ELIQUIS ANTICOAGULANT) 5 MG tablet Take 1 tablet (5 mg) by mouth 2 times daily. 60 tablet 0     Calcium 200 MG TABS        carvedilol (COREG) 6.25 MG tablet Take 1 tablet (6.25 mg) by mouth 2 times daily (with meals). 180 tablet 3     fish oil-omega-3 fatty acids 1000 MG capsule Take 2 g by mouth daily       lactobacillus rhamnosus, GG, (CULTURELL) capsule Take 1 capsule by mouth 2 times daily       magnesium sulfate 3 g        Multiple Vitamin (MULTIVITAMIN ADULT PO)        spironolactone (ALDACTONE) 25 MG tablet Take 1 tablet (25 mg) by mouth daily. 90 tablet 3     vitamin D3 (CHOLECALCIFEROL) 1.25 MG (41505 UT) capsule Take 50,000 Units by mouth every 7 days            " Past Medical History     Past Medical History:   Diagnosis Date     Disorder of bone and cartilage, unspecified 9/23/2004    moderate osteopenia     Other abnormal glucose      Past Surgical History:   Procedure Laterality Date     C MAMMOGRAM, SCREENING  6/2000    yearly watching one area, fibrocystic chnages     COLPOSCOPY,BX CERVIX/ENDOCERV CURR  1979     HCL PAP SMEAR  7/2000    Dr Rendon     Z LIGATE FALLOPIAN TUBE,POSTPARTUM  1993    Tubal Ligation     Family History   Problem Relation Age of Onset     Diabetes Mother         type 2     Hypertension Mother      Osteoporosis Mother      Cerebrovascular Disease Mother         age 82     Hypertension Father      Diabetes Sister         type 2            Allergies   Erythromycin    35 minutes spent on the date of the encounter doing chart review, history and exam, documentation and further activities as noted above      Thank you for allowing me to participate in the care of your patient.      Sincerely,     Nikole Land PA-C     [facility]  cc:   Hector Reeves MD  [address] [facility] [address]  [place] [address]

## 2025-03-17 NOTE — PATIENT INSTRUCTIONS
It was nice seeing you today, please call [phone] if you have any questions or concerns     Plan   No Medication Changes today, continue current medications   Continue to monitor blood pressure, goal blood pressure <120/80  Stroke Risk - CHADSVASc score of 3 (age +1, female +1, high blood pressure +1)  Follow up    - Follow up with Cardiology in 6 months or sooner if needed   - Scheduling phone number [phone]    Nikole Land PA-C  [facility]

## 2025-04-12 ENCOUNTER — HEALTH MAINTENANCE LETTER (OUTPATIENT)
Age: 70
End: 2025-04-12